# Patient Record
Sex: MALE | Race: WHITE | NOT HISPANIC OR LATINO | URBAN - METROPOLITAN AREA
[De-identification: names, ages, dates, MRNs, and addresses within clinical notes are randomized per-mention and may not be internally consistent; named-entity substitution may affect disease eponyms.]

---

## 2018-03-27 ENCOUNTER — OUTPATIENT (OUTPATIENT)
Dept: OUTPATIENT SERVICES | Facility: HOSPITAL | Age: 58
LOS: 1 days | End: 2018-03-27

## 2019-12-04 ENCOUNTER — APPOINTMENT (OUTPATIENT)
Dept: CARDIOLOGY | Facility: CLINIC | Age: 59
End: 2019-12-04

## 2019-12-04 PROBLEM — Z00.00 ENCOUNTER FOR PREVENTIVE HEALTH EXAMINATION: Status: ACTIVE | Noted: 2019-12-04

## 2019-12-18 PROBLEM — Z78.9 CAFFEINE USE: Status: ACTIVE | Noted: 2019-12-13

## 2019-12-18 PROBLEM — I49.9 ARRHYTHMIA: Status: ACTIVE | Noted: 2019-12-13

## 2019-12-18 PROBLEM — I25.10 CARDIOVASCULAR DISEASE: Status: ACTIVE | Noted: 2019-12-13

## 2019-12-18 PROBLEM — I34.1 MITRAL VALVE PROLAPSE: Status: ACTIVE | Noted: 2019-12-13

## 2019-12-18 PROBLEM — I34.0 MITRAL VALVE REGURGITATION: Status: ACTIVE | Noted: 2019-12-13

## 2019-12-18 PROBLEM — I38 VALVULAR HEART DISEASE: Status: ACTIVE | Noted: 2019-12-13

## 2019-12-18 PROBLEM — I48.0 PAROXYSMAL ATRIAL FIBRILLATION: Status: ACTIVE | Noted: 2019-12-13

## 2019-12-18 PROBLEM — Z78.9 SOCIAL ALCOHOL USE: Status: ACTIVE | Noted: 2019-12-13

## 2019-12-18 PROBLEM — Z86.79 HISTORY OF CARDIOMYOPATHY: Status: RESOLVED | Noted: 2019-12-13 | Resolved: 2019-12-18

## 2019-12-19 ENCOUNTER — APPOINTMENT (OUTPATIENT)
Dept: ELECTROPHYSIOLOGY | Facility: CLINIC | Age: 59
End: 2019-12-19
Payer: COMMERCIAL

## 2019-12-19 VITALS
OXYGEN SATURATION: 98 % | HEART RATE: 49 BPM | WEIGHT: 191 LBS | BODY MASS INDEX: 25.31 KG/M2 | HEIGHT: 73 IN | DIASTOLIC BLOOD PRESSURE: 70 MMHG | SYSTOLIC BLOOD PRESSURE: 112 MMHG

## 2019-12-19 DIAGNOSIS — I49.9 CARDIAC ARRHYTHMIA, UNSPECIFIED: ICD-10-CM

## 2019-12-19 DIAGNOSIS — I25.10 ATHEROSCLEROTIC HEART DISEASE OF NATIVE CORONARY ARTERY W/OUT ANGINA PECTORIS: ICD-10-CM

## 2019-12-19 DIAGNOSIS — Z78.9 OTHER SPECIFIED HEALTH STATUS: ICD-10-CM

## 2019-12-19 DIAGNOSIS — I34.0 NONRHEUMATIC MITRAL (VALVE) INSUFFICIENCY: ICD-10-CM

## 2019-12-19 DIAGNOSIS — I38 ENDOCARDITIS, VALVE UNSPECIFIED: ICD-10-CM

## 2019-12-19 DIAGNOSIS — Z86.79 PERSONAL HISTORY OF OTHER DISEASES OF THE CIRCULATORY SYSTEM: ICD-10-CM

## 2019-12-19 DIAGNOSIS — I48.0 PAROXYSMAL ATRIAL FIBRILLATION: ICD-10-CM

## 2019-12-19 DIAGNOSIS — I34.1 NONRHEUMATIC MITRAL (VALVE) PROLAPSE: ICD-10-CM

## 2019-12-19 PROCEDURE — 99204 OFFICE O/P NEW MOD 45 MIN: CPT | Mod: 25

## 2019-12-19 PROCEDURE — 93000 ELECTROCARDIOGRAM COMPLETE: CPT

## 2019-12-19 NOTE — REASON FOR VISIT
[Consultation] : a consultation regarding [Atrial Fibrillation] : atrial fibrillation [Spouse] : spouse [FreeTextEntry1] : ref Dr Garrison

## 2019-12-19 NOTE — HISTORY OF PRESENT ILLNESS
[FreeTextEntry1] : 59 year old gentleman with history of cardiomyopathy (suspected to be AF related), MV prolapse and MR, and persistent atrial fibrillation s/p prior ablations presenting for evaluation of atrial fibrillation. \par He has a long history of pAF initially diagnosed in 2005. At that time he had severe LV dysfunction (LVEF <20%) thought to be related to be AF and tachycardia (cath at that time revealed normal coronaries). He underwent several cardioversions,  and ultimately underwent AF ablation in 2/2013 (Dr. Wilbur Nascimento). He had recurrent AF and had repeat ablation in 2016, but reportedly was told there was nothing new to do at that time, and no ablation was performed.  He had infrequent episodes of AF since his ablations and has had improved LV function (TTE 6/2017 revealed LVEF 45-50%). Over the last 2 weeks he has noted fatigue and dyspnea with strenuous activity, and has felt that he was back in AF. On ECG today he is in atrial fibrillation with rapid ventricular rates (avg 133 bpm, with narrow QRS), and prior ECG 12/3/19 revealed atrial fibrillation with average HR  115 bpm.\par He had been on ASA, but restarted Xarelto when AF recurred, and has remained on bisoprolol 10mg qd.\par TTE repeated 12/13/19 revealed LVEF 43%, with mod-severe eccentric MR\par He reports he strongly desires to avoid additional medical therapy. \par When he is not in atrial fibrillation he feels very well, and exercises regularly without limitation. \par

## 2019-12-19 NOTE — REVIEW OF SYSTEMS
[Feeling Fatigued] : feeling fatigued [Negative] : Musculoskeletal [Fever] : no fever [Chills] : no chills [Shortness Of Breath] : no shortness of breath [Dyspnea on exertion] : not dyspnea during exertion [Chest Pain] : no chest pain [Palpitations] : no palpitations [Dizziness] : no dizziness [Convulsions] : no convulsions [Confusion] : no confusion was observed [Anxiety] : no anxiety [Easy Bleeding] : no tendency for easy bleeding [Easy Bruising] : no tendency for easy bruising

## 2019-12-19 NOTE — PHYSICAL EXAM
[General Appearance - Well Developed] : well developed [General Appearance - Well Nourished] : well nourished [Well Groomed] : well groomed [General Appearance - In No Acute Distress] : no acute distress [Normal Oral Mucosa] : normal oral mucosa [Normal Jugular Venous V Waves Present] : normal jugular venous V waves present [Murmurs] : no murmurs present [Heart Sounds] : normal S1 and S2 [Edema] : no peripheral edema present [Respiration, Rhythm And Depth] : normal respiratory rhythm and effort [Auscultation Breath Sounds / Voice Sounds] : lungs were clear to auscultation bilaterally [Abdomen Soft] : soft [Abdomen Tenderness] : non-tender [Abnormal Walk] : normal gait [Cyanosis, Localized] : no localized cyanosis [Nail Clubbing] : no clubbing of the fingernails [Skin Color & Pigmentation] : normal skin color and pigmentation [Oriented To Time, Place, And Person] : oriented to person, place, and time [Impaired Insight] : insight and judgment were intact [] : no rash [No Anxiety] : not feeling anxious [FreeTextEntry1] : irregularly irregular, rapid rhythm

## 2019-12-19 NOTE — DISCUSSION/SUMMARY
[FreeTextEntry1] : 59 year old gentleman with MV prolapse and MR, and persistent atrial fibrillation with associated cardiomyopathy s/p prior ablations, presenting for evaluation of recurrent atrial fibrillation. He has a very long history of AF with RVR and has had severe LV dysfunction in this setting. He has undergone several DCCVs and prior ablation, but now has recurrent AF with rapid rates despite beta blockade with Bystolic 10mg qd, and moderate LV dysfunction. He does also have mitral valve disease and at least moderate MR, but has been asymptomatic from this perspective apart from the arrhythmia. \par Given his cardiomyopathy, we discussed the importance of strict rate control, and preferably restoration of sinus rhythm. He does strongly desire to avoid antiarrhythmic medication. We did discuss the potential for another AF ablation, which would likely require ablation of non-PV substrate. In addition, if his mitral regurgitation is progressive, he may ultimately require MV surgery, in which case concomitant surgical AF ablation, and potentially VALENTINA resection, could be performed. At this point, he is not agreeable to proceed with further ablation, but will consider it. \par As an initial strategy will plan MATT/DCCV to restore sinus rhythm. In the interim, will increase rate control to Bystolic 10mg bid. Will continue Xarelto. \par At time of MATT, will evaluate severity of MV regurgitation. If severe, would consider surgical referral for MV repair and concomitant AF ablation/VALENTINA resection.\par Follow-up after above to reconsider long-term management options.\par

## 2020-01-07 ENCOUNTER — APPOINTMENT (OUTPATIENT)
Dept: ULTRASOUND IMAGING | Facility: CLINIC | Age: 60
End: 2020-01-07
Payer: COMMERCIAL

## 2020-01-07 PROCEDURE — 76705 ECHO EXAM OF ABDOMEN: CPT

## 2020-01-14 ENCOUNTER — OUTPATIENT (OUTPATIENT)
Dept: OUTPATIENT SERVICES | Facility: HOSPITAL | Age: 60
LOS: 1 days | End: 2020-01-14
Payer: COMMERCIAL

## 2020-01-14 VITALS
OXYGEN SATURATION: 98 % | WEIGHT: 197.98 LBS | TEMPERATURE: 98 F | RESPIRATION RATE: 18 BRPM | HEIGHT: 74 IN | SYSTOLIC BLOOD PRESSURE: 132 MMHG | DIASTOLIC BLOOD PRESSURE: 99 MMHG | HEART RATE: 96 BPM

## 2020-01-14 DIAGNOSIS — Z01.818 ENCOUNTER FOR OTHER PREPROCEDURAL EXAMINATION: ICD-10-CM

## 2020-01-14 LAB
ANION GAP SERPL CALC-SCNC: 12 MMOL/L — SIGNIFICANT CHANGE UP (ref 5–17)
APTT BLD: 39.5 SEC — HIGH (ref 27.5–36.3)
BUN SERPL-MCNC: 22 MG/DL — HIGH (ref 8–20)
CALCIUM SERPL-MCNC: 9.2 MG/DL — SIGNIFICANT CHANGE UP (ref 8.6–10.2)
CHLORIDE SERPL-SCNC: 102 MMOL/L — SIGNIFICANT CHANGE UP (ref 98–107)
CO2 SERPL-SCNC: 23 MMOL/L — SIGNIFICANT CHANGE UP (ref 22–29)
CREAT SERPL-MCNC: 0.85 MG/DL — SIGNIFICANT CHANGE UP (ref 0.5–1.3)
GLUCOSE SERPL-MCNC: 102 MG/DL — SIGNIFICANT CHANGE UP (ref 70–115)
HCT VFR BLD CALC: 44.3 % — SIGNIFICANT CHANGE UP (ref 39–50)
HGB BLD-MCNC: 14.7 G/DL — SIGNIFICANT CHANGE UP (ref 13–17)
INR BLD: 2.22 RATIO — HIGH (ref 0.88–1.16)
MAGNESIUM SERPL-MCNC: 1.9 MG/DL — SIGNIFICANT CHANGE UP (ref 1.6–2.6)
MCHC RBC-ENTMCNC: 30.6 PG — SIGNIFICANT CHANGE UP (ref 27–34)
MCHC RBC-ENTMCNC: 33.2 GM/DL — SIGNIFICANT CHANGE UP (ref 32–36)
MCV RBC AUTO: 92.3 FL — SIGNIFICANT CHANGE UP (ref 80–100)
PLATELET # BLD AUTO: 162 K/UL — SIGNIFICANT CHANGE UP (ref 150–400)
POTASSIUM SERPL-MCNC: 4.1 MMOL/L — SIGNIFICANT CHANGE UP (ref 3.5–5.3)
POTASSIUM SERPL-SCNC: 4.1 MMOL/L — SIGNIFICANT CHANGE UP (ref 3.5–5.3)
PROTHROM AB SERPL-ACNC: 26.2 SEC — HIGH (ref 10–12.9)
RBC # BLD: 4.8 M/UL — SIGNIFICANT CHANGE UP (ref 4.2–5.8)
RBC # FLD: 11.9 % — SIGNIFICANT CHANGE UP (ref 10.3–14.5)
SODIUM SERPL-SCNC: 137 MMOL/L — SIGNIFICANT CHANGE UP (ref 135–145)
WBC # BLD: 6.97 K/UL — SIGNIFICANT CHANGE UP (ref 3.8–10.5)
WBC # FLD AUTO: 6.97 K/UL — SIGNIFICANT CHANGE UP (ref 3.8–10.5)

## 2020-01-14 PROCEDURE — 83735 ASSAY OF MAGNESIUM: CPT

## 2020-01-14 PROCEDURE — 85610 PROTHROMBIN TIME: CPT

## 2020-01-14 PROCEDURE — 93005 ELECTROCARDIOGRAM TRACING: CPT

## 2020-01-14 PROCEDURE — 85730 THROMBOPLASTIN TIME PARTIAL: CPT

## 2020-01-14 PROCEDURE — 93010 ELECTROCARDIOGRAM REPORT: CPT

## 2020-01-14 PROCEDURE — 85027 COMPLETE CBC AUTOMATED: CPT

## 2020-01-14 PROCEDURE — 80048 BASIC METABOLIC PNL TOTAL CA: CPT

## 2020-01-14 PROCEDURE — 36415 COLL VENOUS BLD VENIPUNCTURE: CPT

## 2020-01-14 PROCEDURE — G0463: CPT

## 2020-01-14 NOTE — H&P PST ADULT - ASSESSMENT
59 year old gentleman with history of cardiomyopathy (suspected to be AF related), MV prolapse and MR, and persistent atrial fibrillation s/p prior ablations and multiple DCCVs presenting for repeat MATT/DCCV on 1/17/2020     - NPO post midnight  - Patient instructed to continue uninterrupted anticoagulation    **At the time of MATT, will evaluate severity of MV regurgitation. If severe, would consider surgical referral for MV repair and concomitant AF ablation/VALENTINA resection.  Follow-up after above to reconsider long-term management options as outpatient**

## 2020-01-14 NOTE — H&P PST ADULT - RS GEN PE MLT RESP DETAILS PC
clear to auscultation bilaterally/good air movement/no chest wall tenderness/airway patent/breath sounds equal/respirations non-labored

## 2020-01-14 NOTE — H&P PST ADULT - HISTORY OF PRESENT ILLNESS
59 year old gentleman with history of cardiomyopathy (suspected to be AF related), MV prolapse and MR, and persistent atrial fibrillation s/p prior ablations presenting for evaluation of atrial fibrillation.  He has a long history of PAF initially diagnosed in 2005. At that time he had severe LV dysfunction (LVEF <20%) thought to be related to be AF and tachycardia (cath at that time revealed normal coronaries). He underwent several cardioversions, and ultimately underwent AF ablation in 2/2013 (Dr. iWlbur Nascimento). He had recurrent AF and had repeat ablation in 2016, but reportedly was told there was nothing new to do at that time, and no ablation was performed. He had infrequent episodes of AF since his ablations and has had improved LV function (TTE 6/2017 revealed LVEF 45-50%). Over the last several weeks he has noted fatigue and dyspnea with strenuous activity, and has felt that he was back in AF. He reports he strongly desires to avoid additional medical therapy. When he is not in atrial fibrillation he feels very well, and exercises regularly without limitation.     Cardiology Summary  Echo: 12/13/19, LVEF 43%, LA mildly dilated (LA 4.5cm), moderately severe eccentric MR mod MV

## 2020-01-17 ENCOUNTER — OUTPATIENT (OUTPATIENT)
Dept: OUTPATIENT SERVICES | Facility: HOSPITAL | Age: 60
LOS: 1 days | End: 2020-01-17
Payer: COMMERCIAL

## 2020-01-17 ENCOUNTER — TRANSCRIPTION ENCOUNTER (OUTPATIENT)
Age: 60
End: 2020-01-17

## 2020-01-17 VITALS
OXYGEN SATURATION: 99 % | TEMPERATURE: 98 F | HEART RATE: 86 BPM | RESPIRATION RATE: 16 BRPM | SYSTOLIC BLOOD PRESSURE: 138 MMHG | DIASTOLIC BLOOD PRESSURE: 81 MMHG

## 2020-01-17 DIAGNOSIS — I49.9 CARDIAC ARRHYTHMIA, UNSPECIFIED: ICD-10-CM

## 2020-01-17 PROCEDURE — 93005 ELECTROCARDIOGRAM TRACING: CPT

## 2020-01-17 PROCEDURE — 93320 DOPPLER ECHO COMPLETE: CPT

## 2020-01-17 PROCEDURE — 92960 CARDIOVERSION ELECTRIC EXT: CPT

## 2020-01-17 PROCEDURE — 93320 DOPPLER ECHO COMPLETE: CPT | Mod: 26

## 2020-01-17 PROCEDURE — 93312 ECHO TRANSESOPHAGEAL: CPT

## 2020-01-17 PROCEDURE — 93010 ELECTROCARDIOGRAM REPORT: CPT

## 2020-01-17 PROCEDURE — 93325 DOPPLER ECHO COLOR FLOW MAPG: CPT | Mod: 26

## 2020-01-17 PROCEDURE — 93325 DOPPLER ECHO COLOR FLOW MAPG: CPT

## 2020-01-17 PROCEDURE — 93312 ECHO TRANSESOPHAGEAL: CPT | Mod: 26

## 2020-01-17 PROCEDURE — 76376 3D RENDER W/INTRP POSTPROCES: CPT | Mod: 26

## 2020-01-17 RX ORDER — BISOPROLOL FUMARATE 10 MG/1
1 TABLET, FILM COATED ORAL
Qty: 0 | Refills: 0 | DISCHARGE
Start: 2020-01-17

## 2020-01-17 RX ORDER — BISOPROLOL FUMARATE 10 MG/1
1 TABLET, FILM COATED ORAL
Qty: 0 | Refills: 0 | DISCHARGE

## 2020-01-17 RX ORDER — OMEGA-3 ACID ETHYL ESTERS 1 G
1 CAPSULE ORAL
Qty: 0 | Refills: 0 | DISCHARGE

## 2020-01-17 RX ORDER — RIVAROXABAN 15 MG-20MG
1 KIT ORAL
Qty: 0 | Refills: 0 | DISCHARGE

## 2020-01-17 NOTE — PROGRESS NOTE ADULT - SUBJECTIVE AND OBJECTIVE BOX
Pt doing well s/p uncomplicated MATT & DCCV, 200J X 1.  Pt denies complaint post procedure.        PAST MEDICAL & SURGICAL HISTORY:  AF (atrial fibrillation)    Exam:   VSS, NAD, A&O x 3  Skin: no erythema/edema/blistering at defib pad sites.   Card: S1/S2, RRR, no m/g/r  Resp: lungs CTA b/l  Abd: S/NT/ND  Ext: no edema, distal pulses intact    EKG: sinus bradycardia 40bpm     MATT 1/17/2020:   Summary:   1. Left ventricular ejection fraction, by visual estimation, is 30 to 35%.   2. Severely decreased global left ventricular systolic function.   3. Mildly reduced RV systolic function.   4. 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   5. Mild tricuspid regurgitation.   6. No intracardiac thrombus   7. Small patent foramen ovale.   8. No significant atheroma   9. No pericardial effusion  10. ** No prior echocardiograms available for comparison.  Yadira Sheriff DO Electronically signed on 1/17/2020 at 9:30:46 AM     Assessment:   59 year old gentleman with history of cardiomyopathy (suspected to be AF related), MV prolapse and MR, and persistent atrial fibrillation s/p prior ablations and multiple DCCVs.  He presented electively and is now status post MATT negative for VALENTINA thrombus and uncomplicated DCCV with restoration of sinus rhythm.     Plan:   Observation on telemetry per post op protocol.    Resume PO intake.   Ambulate w/ assist once fully awake & back to baseline mental status w/ VSS.  Continue Xarelto. Importance of strict compliance with anticoagulation regimen reinforced with pt.   Decrease bisoprolol to once daily.    Resume other home medications.   Anticipate d/c home once all criteria met, with outpt f/up in 1 month. Pt doing well s/p uncomplicated MATT & DCCV, 200J X 1.  Pt denies complaint post procedure.        Pt with positive HR response to exercise, 60-70s.  Pt denies dizziness, lightheadedness.     PAST MEDICAL & SURGICAL HISTORY:  AF (atrial fibrillation)    Exam:   VSS, NAD, A&O x 3  Skin: no erythema/edema/blistering at defib pad sites.   Card: S1/S2, RRR, no m/g/r  Resp: lungs CTA b/l  Abd: S/NT/ND  Ext: no edema, distal pulses intact    EKG: sinus bradycardia 40bpm     MATT 1/17/2020:   Summary:   1. Left ventricular ejection fraction, by visual estimation, is 30 to 35%.   2. Severely decreased global left ventricular systolic function.   3. Mildly reduced RV systolic function.   4. 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   5. Mild tricuspid regurgitation.   6. No intracardiac thrombus   7. Small patent foramen ovale.   8. No significant atheroma   9. No pericardial effusion  10. ** No prior echocardiograms available for comparison.  Yadira Sheriff DO Electronically signed on 1/17/2020 at 9:30:46 AM     Assessment:   59 year old gentleman with history of cardiomyopathy (suspected to be AF related), MV prolapse and MR, and persistent atrial fibrillation s/p prior ablations and multiple DCCVs.  He presented electively and is now status post MATT negative for VALENTINA thrombus and uncomplicated DCCV with restoration of sinus rhythm.     Plan:   Observation on telemetry per post op protocol.    Resume PO intake.   Ambulate w/ assist once fully awake & back to baseline mental status w/ VSS.  Continue Xarelto. Importance of strict compliance with anticoagulation regimen reinforced with pt.   Decrease bisoprolol to once daily.    Resume other home medications.   Anticipate d/c home once all criteria met, with outpt f/up in 1 month.

## 2020-01-17 NOTE — DISCHARGE NOTE PROVIDER - NSDCCPTREATMENT_GEN_ALL_CORE_FT
PRINCIPAL PROCEDURE  Procedure: Transesophageal echocardiogram  Findings and Treatment: Notify your doctor if you develop a fever, cough up blood, have any chest pain, palpitations or difficulty breathing. You may take a throat lozenge if you develop a sore throat or try warm salt water gargle. Resume your diet with soft foods first. Follow up with your cardiologist within 2 weeks for a follow up or sooner with any concerns. Report to the nearest ER with any emergencies.        SECONDARY PROCEDURE  Procedure: Cardioversion external  Findings and Treatment: -Take each dose of your anticoagulation medication (blood thinner) exactly as prescribed.   - Do not drive, operate heavy machinery or make important decisions for 24 hours following the procedure.  - You may resume all other activities the day after the procedure.  Call your doctor if:   - your rapid heart rhythm returns.  - you have any questions or concerns regarding the procedure.  If you experience increased difficulty breathing or chest pain, or if you faint or have dizzy spells, please seek immediate medical attention.

## 2020-01-17 NOTE — DISCHARGE NOTE PROVIDER - CARE PROVIDER_API CALL
Chris Calderon)  Cardiology; Internal Medicine  [address] [facility], [address] [place] [address]  Phone: [phone]  Fax: [phone]  Follow Up Time:

## 2020-01-17 NOTE — DISCHARGE NOTE PROVIDER - NSDCFUADDINST_GEN_ALL_CORE_FT
Follow up with Dr. Calderon in 2-3 weeks.  Our office will contact you in 3-5 days to schedule this appointment. Please call [phone] with questions or concerns.

## 2020-01-17 NOTE — DISCHARGE NOTE NURSING/CASE MANAGEMENT/SOCIAL WORK - PATIENT PORTAL LINK FT
You can access the FollowMyHealth Patient Portal offered by [facility] by registering at the following website: [email or web address]. By joining [employer]’s FollowMyHealth portal, you will also be able to view your health information using other applications (apps) compatible with our system.

## 2020-01-17 NOTE — DISCHARGE NOTE PROVIDER - NSDCMRMEDTOKEN_GEN_ALL_CORE_FT
bisoprolol 10 mg oral tablet: 1 tab(s) orally once a day  Multiple Vitamins oral tablet: 1 tab(s) orally once a day  Omega-3 1000 mg oral capsule: 1 cap(s) orally once a day  Xarelto 20 mg oral tablet: 1 tab(s) orally once a day (in the evening)

## 2020-01-17 NOTE — DISCHARGE NOTE PROVIDER - HOSPITAL COURSE
59 year old gentleman with history of cardiomyopathy (suspected to be AF related), MV prolapse and MR, and persistent atrial fibrillation s/p prior ablations and multiple DCCVs.  He presented electively and is now status post MATT negative for VALENTINA thrombus and uncomplicated DCCV with restoration of sinus rhythm. 59 year old gentleman with history of cardiomyopathy (suspected to be AF related), MV prolapse and MR, and persistent atrial fibrillation s/p prior ablations and multiple DCCVs.  He presented electively and is now status post MATT negative for VALENTINA thrombus and uncomplicated DCCV with restoration of sinus rhythm.  The patient was observed per post procedure protocol, then discharged home with a plan for outpatient follow up.

## 2020-01-21 ENCOUNTER — TRANSCRIPTION ENCOUNTER (OUTPATIENT)
Age: 60
End: 2020-01-21

## 2020-01-21 PROBLEM — I48.91 UNSPECIFIED ATRIAL FIBRILLATION: Chronic | Status: ACTIVE | Noted: 2020-01-14

## 2020-02-20 ENCOUNTER — APPOINTMENT (OUTPATIENT)
Dept: ELECTROPHYSIOLOGY | Facility: CLINIC | Age: 60
End: 2020-02-20
Payer: COMMERCIAL

## 2020-02-20 VITALS
WEIGHT: 194 LBS | HEIGHT: 73 IN | SYSTOLIC BLOOD PRESSURE: 136 MMHG | HEART RATE: 58 BPM | DIASTOLIC BLOOD PRESSURE: 78 MMHG | OXYGEN SATURATION: 97 % | BODY MASS INDEX: 25.71 KG/M2

## 2020-02-20 VITALS
HEART RATE: 54 BPM | BODY MASS INDEX: 25.6 KG/M2 | SYSTOLIC BLOOD PRESSURE: 136 MMHG | DIASTOLIC BLOOD PRESSURE: 78 MMHG | HEIGHT: 73 IN

## 2020-02-20 PROCEDURE — 93000 ELECTROCARDIOGRAM COMPLETE: CPT

## 2020-02-20 PROCEDURE — 99215 OFFICE O/P EST HI 40 MIN: CPT

## 2020-02-20 NOTE — PHYSICAL EXAM
[General Appearance - Well Nourished] : well nourished [General Appearance - Well Developed] : well developed [Well Groomed] : well groomed [Normal Jugular Venous V Waves Present] : normal jugular venous V waves present [Normal Oral Mucosa] : normal oral mucosa [General Appearance - In No Acute Distress] : no acute distress [Respiration, Rhythm And Depth] : normal respiratory rhythm and effort [Auscultation Breath Sounds / Voice Sounds] : lungs were clear to auscultation bilaterally [Heart Rate And Rhythm] : heart rate and rhythm were normal [Heart Sounds] : normal S1 and S2 [Edema] : no peripheral edema present [Murmurs] : no murmurs present [Abdomen Tenderness] : non-tender [Abdomen Soft] : soft [Abnormal Walk] : normal gait [Cyanosis, Localized] : no localized cyanosis [Skin Color & Pigmentation] : normal skin color and pigmentation [Nail Clubbing] : no clubbing of the fingernails [Oriented To Time, Place, And Person] : oriented to person, place, and time [] : no rash [No Anxiety] : not feeling anxious [Impaired Insight] : insight and judgment were intact [FreeTextEntry1] : regular bradycardia

## 2020-02-20 NOTE — DISCUSSION/SUMMARY
[FreeTextEntry1] : 59 year old gentleman with history of persistent AF s/p two prior ablations, suspected AF related cardiomyopathy, presenting for follow-up after recent DCCV on 1/17/20. He has had recurrent LV dysfunction in the setting of his recurrent persistent AF, and recently had LVEF 30-35% on MATT pre DCCV. Since DCCV he has been maintaining sinus rhythm, and I do expect his LV function to recover. However, we discussed in detail that he is likely to have recurrent episodes of AF in the future, and that given his associated cardiomyopathy maintenance of sinus rhythm will be very important to reduce the risk of progressive cardiomyopathy and CHF. He is likely unable to tolerate sufficient medical therapy (either rate control or antiarrhythmic medications) due to his resting bradycardia and slower HRs with beta blockade so far that has caused him to stop taking this medication. I do therefore believe that another ablation will be the most effective method to reduce the risk of recurrent AF, and that approaches beyond PVI alone will likely be necessary to be effective with ablation. We did discuss these approaches in detail. For now he will restart a low dose of bisoprolol (if tolerated), and consider additional ablation in the future. \par In addition, we discussed his thromboembolic risk, which is particularly elevated in the initial period following DCCV. He currently has a CHADSVASc of 1 (LV dysfunction/CHF), and I suggested that he continue anticoagulation with Xarelto at least until LV recovery is documented on TTE.  \par -restart Xarelto 20mg qd. Stop concomitant ASA.\par -plan repeat TTE in 3 months post DCCV. If LV function recovered, would reconsider risks and benefits of ongoing anticoagulation. \par -restart bisoprolol 5mg qd. If symptoms of fatigue, symptomatic bradycardia, lightheadedness, syncope, etc, stop beta blockade. \par -recommend further monitoring to evaluate for recurrent (potentially asymptomatic AF) to help guide further management. An ILR may be useful particularly if he does stop anticoagulation. He has deferred this for now.\par -EP followup in 3 mo or prn if pt agreeable after follow-up with Dr. Garrison\par \par

## 2020-02-20 NOTE — HISTORY OF PRESENT ILLNESS
[FreeTextEntry1] : 59 year old gentleman with history of persistent AF s/p two prior ablations, suspected AF related cardiomyopathy, presenting for follow-up after recent DCCV on 1/17/20. \par \par He has a history of persistent AF since 2005, and had severe LV function at that time with LVEF <20% and normal coronaries on cath. He underwent AF ablation in 2/2013 and repeat ablation 2016 (unclear if any ablation performed in the second procedure, both at [place]). He subsequently had infrequent recurrent AF and had improved LV function (LVEF up to 45-50% in 6/2017).\par He recently presented with fatigue and dyspnea, and was found to have recurrent persistent AF with RVR. He restarted Xarelto and bisoprolol, and TTE revealed LVEF 43%. He recently underwent MATT/DCCV on 1/17/20.  MATT revealed LVEF 30-35% with global LV hypokinesis and mild MR (had previously been thought to be mod-severe). He was continued on anticoagulation with Xarelto, and rate control with bisoprolol (rate lowered to 10mg qd). \par On follow-up he has been feeling well. ECG today reveals sinus rhythm at 54 bpm. \par Due to resting bradycardia (he noted HRs in the 40s on his Apple Watch) he stopped bisoprolol. In addition, he stopped Xarelto because he noted some blood-tinged expectorant when he sneezed, and has been taking ASA 81mg qd alone.\par He has been feeling well, and denies palpitation, dyspnea, chest pain. He reports his only symptom in AF was fatigue which has improved. \par

## 2020-02-20 NOTE — REVIEW OF SYSTEMS
[Feeling Fatigued] : feeling fatigued [Negative] : Integumentary [Fever] : no fever [Chills] : no chills [Shortness Of Breath] : no shortness of breath [Chest Pain] : no chest pain [Dyspnea on exertion] : not dyspnea during exertion [Palpitations] : no palpitations [Dizziness] : no dizziness [Convulsions] : no convulsions [Confusion] : no confusion was observed [Anxiety] : no anxiety [Easy Bruising] : no tendency for easy bruising [Easy Bleeding] : no tendency for easy bleeding

## 2020-02-20 NOTE — REASON FOR VISIT
[Follow-Up - Clinic] : a clinic follow-up of [Atrial Fibrillation] : atrial fibrillation [FreeTextEntry1] : ref Dr Garrison

## 2020-04-30 ENCOUNTER — TRANSCRIPTION ENCOUNTER (OUTPATIENT)
Age: 60
End: 2020-04-30

## 2020-05-07 ENCOUNTER — APPOINTMENT (OUTPATIENT)
Dept: ELECTROPHYSIOLOGY | Facility: CLINIC | Age: 60
End: 2020-05-07
Payer: COMMERCIAL

## 2020-05-07 VITALS
BODY MASS INDEX: 24.78 KG/M2 | DIASTOLIC BLOOD PRESSURE: 85 MMHG | SYSTOLIC BLOOD PRESSURE: 142 MMHG | WEIGHT: 187 LBS | HEIGHT: 73 IN | HEART RATE: 72 BPM

## 2020-05-07 DIAGNOSIS — I42.9 CARDIOMYOPATHY, UNSPECIFIED: ICD-10-CM

## 2020-05-07 DIAGNOSIS — I48.19 OTHER PERSISTENT ATRIAL FIBRILLATION: ICD-10-CM

## 2020-05-07 PROCEDURE — 99214 OFFICE O/P EST MOD 30 MIN: CPT | Mod: 95

## 2020-05-07 RX ORDER — ASPIRIN 325 MG/1
325 TABLET, FILM COATED ORAL DAILY
Refills: 0 | Status: ACTIVE | COMMUNITY

## 2020-05-07 RX ORDER — RIVAROXABAN 20 MG/1
20 TABLET, FILM COATED ORAL DAILY
Refills: 0 | Status: DISCONTINUED | COMMUNITY
End: 2020-05-07

## 2020-05-07 RX ORDER — BISOPROLOL FUMARATE 5 MG/1
5 TABLET, FILM COATED ORAL DAILY
Qty: 90 | Refills: 0 | Status: DISCONTINUED | COMMUNITY
End: 2020-05-07

## 2020-05-07 NOTE — PHYSICAL EXAM
[General Appearance - Well Developed] : well developed [General Appearance - Well Nourished] : well nourished [Oriented To Time, Place, And Person] : oriented to person, place, and time [No Anxiety] : not feeling anxious

## 2020-05-08 PROBLEM — I48.19 PERSISTENT ATRIAL FIBRILLATION: Status: ACTIVE | Noted: 2020-05-08

## 2020-05-08 PROBLEM — I42.9 CARDIOMYOPATHY: Status: ACTIVE | Noted: 2020-05-08

## 2020-05-11 NOTE — HISTORY OF PRESENT ILLNESS
[Medical Office: ([facility])___] : at the medical office located in  [Home] : at home, [unfilled] , at the time of the visit. [Self] : self [Patient] : the patient [FreeTextEntry1] : Time Initiated: 1:07 pm\par Time completed:  1:50 pm\par \par 59 year old gentleman with history of persistent AF s/p two prior ablations (most recent 1/2018 @ [place]), suspected AF related cardiomyopathy, and multiple prior cardioversion most recently 1/2020 at [facility]. He has had recurrent LV dysfunction in the setting of his recurrent persistent AF, and recently had LVEF 30-35% on MATT pre DCCV.  \par \par Since DCCV he has been maintaining sinus rhythm (monitored on Apple watch).  Repeat TTE has not been preformed, but we suspect his LV function has likely recovered. Doing well today and denies recent symptoms of palpitations, dizziness, syncope, CP, SOB, GUEVARA or fatigue.  Self discontinued Xarelto 90 days after cardioversion. He also stopped his Bioprolol 5mg due to baseline bradycardia (HR in high 30 - 40's at rest).  \par Repeat AF ablation was discussed at prior consultation with Dr. Calderon.  Patient was considering but not ready for planning at that time. \par \par CHADSVASC 1-2 for CM and ?HTN.  No former diagnosis of HTN but notes SBP often runs in high 130-140's. [FreeTextEntry2] : Erickson Avila [FreeTextEntry4] : NALLELY Worrell

## 2020-05-11 NOTE — ADDENDUM
[FreeTextEntry1] : I was present for the above visit (on telehealth) and agree with history and assessment/plan as above. Pt generally feeling well, but now increasingly concerned about likelihood of recurrent AF episodes, and given his recurrent cardiomyopathy and LV dysfunction in the setting of AF, warrants further efforts for rhythm control. We discussed management options, but given his baseline bradycardia is not a good candidate for long-term antiarrhythmic medications. We discussed AF ablation in detail, including procedure related risks such as bleeding, vascular injury, cardiac perforation, stroke, esophageal injury. He expressed understanding, and does want to proceed with AF ablation. Of note, he has a history of cryoAF ablation in the past (and a second procedure in which it is likely no ablation was performed potentinally due to persistent PVI at that time).

## 2020-05-11 NOTE — DISCUSSION/SUMMARY
[FreeTextEntry1] : 59 year old gentleman with history of persistent AF s/p two prior ablations (most recent 1/2018 @ [place]), suspected AF related cardiomyopathy, and multiple prior cardioversion most recently 1/2020 at [facility]. He has had recurrent LV dysfunction in the setting of his recurrent persistent AF (LVEF 30-35% on MATT pre DCCV).  \par Seen today for f/up via Telehealth.  Reportedly maintaining sinus rhythm (monitored on Apple watch) and asymptomatic.  Repeat TTE has not been preformed, but we suspect his LV function has likely recovered. CHADSVASC 1-2 for CM and possible HTN.  Self discontinued a/c at > 90 days post cardioversion.  Denies associated bleeding issues.  No longer on beta blocker due to baseline bradycardia. \par \par We discussed the likelihood of AF recurrence in the future. Also the importance of maintaining SR given risk of progressive cardiomyopathy and CHF.  We discussed long term rhythm control options including repeat ablation vs AAT. He is likely unable to tolerate sufficient medical therapy due to his resting bradycardia and would like to avoid long term medications. Given this, patient would like to proceed with repeat AF ablation. \par  \par - Restart Xarelto 20mg qd. \par - Plan for AF ablation with Dr. Calderon\par - MATT prior to ablation to check for VALENTINA thrombus and reassess LV function\par \par Seen and d/w Dr. Calderon. \par Lucero Swan PAC\par \par

## 2021-03-30 ENCOUNTER — APPOINTMENT (OUTPATIENT)
Dept: DISASTER EMERGENCY | Facility: OTHER | Age: 61
End: 2021-03-30
Payer: COMMERCIAL

## 2021-03-30 PROCEDURE — 0011A: CPT

## 2021-04-27 ENCOUNTER — APPOINTMENT (OUTPATIENT)
Dept: DISASTER EMERGENCY | Facility: OTHER | Age: 61
End: 2021-04-27
Payer: COMMERCIAL

## 2021-04-27 PROCEDURE — 0012A: CPT

## 2021-05-14 ENCOUNTER — APPOINTMENT (OUTPATIENT)
Dept: ULTRASOUND IMAGING | Facility: CLINIC | Age: 61
End: 2021-05-14
Payer: COMMERCIAL

## 2021-05-14 PROCEDURE — 76705 ECHO EXAM OF ABDOMEN: CPT

## 2021-05-18 ENCOUNTER — APPOINTMENT (OUTPATIENT)
Dept: MRI IMAGING | Facility: CLINIC | Age: 61
End: 2021-05-18
Payer: COMMERCIAL

## 2021-05-18 PROCEDURE — 73721 MRI JNT OF LWR EXTRE W/O DYE: CPT | Mod: RT

## 2022-11-04 NOTE — DISCHARGE NOTE NURSING/CASE MANAGEMENT/SOCIAL WORK - NSDCPEXARELTODIET_GEN_ALL_CORE
Instructions:   Medications: decrease entresto to 24/26 pills twice a day continue other meds  Labs in one week  Follow up: as scheduled with Dr. Ahsan Neville, 6 weeks with Lizbeth Medina Eat healthy foods you enjoy. Rivaroxaban/Xarelto DOES NOT have a special diet. Limit your alcohol intake.

## 2023-05-18 NOTE — DISCHARGE NOTE PROVIDER - NSDCHC_MEDRECSTATUS_GEN_ALL_CORE
Admission Reconciliation is Completed  Discharge Reconciliation is Completed Fluconazole Counseling:  Patient counseled regarding adverse effects of fluconazole including but not limited to headache, diarrhea, nausea, upset stomach, liver function test abnormalities, taste disturbance, and stomach pain.  There is a rare possibility of liver failure that can occur when taking fluconazole.  The patient understands that monitoring of LFTs and kidney function test may be required, especially at baseline. The patient verbalized understanding of the proper use and possible adverse effects of fluconazole.  All of the patient's questions and concerns were addressed.

## 2024-09-20 NOTE — ASU PATIENT PROFILE, ADULT - MUTUALITY COMMENT, PROFILE

## 2024-11-15 ENCOUNTER — APPOINTMENT (OUTPATIENT)
Dept: CT IMAGING | Facility: CLINIC | Age: 64
End: 2024-11-15
Payer: MEDICARE

## 2024-11-15 PROCEDURE — 75574 CT ANGIO HRT W/3D IMAGE: CPT

## 2025-04-29 RX ORDER — METOPROLOL SUCCINATE 25 MG/1
25 TABLET, EXTENDED RELEASE ORAL
Refills: 0 | Status: ACTIVE | COMMUNITY

## 2025-05-06 ENCOUNTER — APPOINTMENT (OUTPATIENT)
Dept: CARDIOTHORACIC SURGERY | Facility: CLINIC | Age: 65
End: 2025-05-06
Payer: MEDICARE

## 2025-05-06 VITALS
TEMPERATURE: 98.1 F | HEIGHT: 73.5 IN | DIASTOLIC BLOOD PRESSURE: 86 MMHG | RESPIRATION RATE: 16 BRPM | SYSTOLIC BLOOD PRESSURE: 136 MMHG | OXYGEN SATURATION: 99 % | WEIGHT: 195 LBS | HEART RATE: 58 BPM | BODY MASS INDEX: 25.29 KG/M2

## 2025-05-06 DIAGNOSIS — I42.9 CARDIOMYOPATHY, UNSPECIFIED: ICD-10-CM

## 2025-05-06 DIAGNOSIS — I25.10 ATHEROSCLEROTIC HEART DISEASE OF NATIVE CORONARY ARTERY W/OUT ANGINA PECTORIS: ICD-10-CM

## 2025-05-06 DIAGNOSIS — I38 ENDOCARDITIS, VALVE UNSPECIFIED: ICD-10-CM

## 2025-05-06 DIAGNOSIS — I48.19 OTHER PERSISTENT ATRIAL FIBRILLATION: ICD-10-CM

## 2025-05-06 DIAGNOSIS — I34.0 NONRHEUMATIC MITRAL (VALVE) INSUFFICIENCY: ICD-10-CM

## 2025-05-06 DIAGNOSIS — Z78.9 OTHER SPECIFIED HEALTH STATUS: ICD-10-CM

## 2025-05-06 DIAGNOSIS — I48.0 PAROXYSMAL ATRIAL FIBRILLATION: ICD-10-CM

## 2025-05-06 DIAGNOSIS — I34.1 NONRHEUMATIC MITRAL (VALVE) PROLAPSE: ICD-10-CM

## 2025-05-06 PROCEDURE — 99204 OFFICE O/P NEW MOD 45 MIN: CPT

## 2025-06-05 NOTE — H&P PST ADULT - NSICDXPROCEDURE_GEN_ALL_CORE_FT
PROCEDURES:  Left and right heart catheterization 06-Jun-2025 08:10:23  Ercikson Kerns  Transesophageal echocardiogram 06-Jun-2025 08:10:32  Erickson Kerns

## 2025-06-05 NOTE — H&P PST ADULT - ASSESSMENT
Assessment:     ASA: 3  Mall: 2  ABR:   GFR:   Creatinine:     Problem List:   1. Severe MR  ·	LHC and possible intervention. Consent obtained.  ·	Procedure explained and questions answered.   ·	Will start DAPT if PCI performed.  ·	IV:  mL IV over 1 hour pre and post procedure  ·	Aspirin if not taken today.  ·	Pt. assessed, appropriate for sedation, pt.  educated regarding the plan for Versed/fentanyl as needed    2.     Discharge Planning:   ·	Discharge today if no PCI performed.  ·	Discharge in AM if PCI performed. Assessment: 64y/o male never smoker PMH MR, MVP, PAF (s/p ablation 2013 and 2016, 8 DCCV last one in 2024, not on AC), cardiomyopathy who presented to CTS Dr. Call to MV evaluation. In 2019, was noted to have moderately severe eccentric MR with moderate MVP of both leaflets. Although he reported no symptoms to his cardiologist, MR was noted to progress to severe with EF 50% and was referred for surgical evaluation. Reports an increase in palpitations when lying on the left side.      ASA: 3  Mall: 2  ABR: 1.0%  GFR: 98  Creatinine: 0.79    Problem List:   1. Severe MR  ·	LHC and possible intervention. Consent obtained.  ·	MATT today.  ·	Procedure explained and questions answered.   ·	Will start DAPT if PCI performed.  ·	IV:  mL IV over 1 hour pre and post procedure  ·	Aspirin if not taken today.  ·	Pt. assessed, appropriate for sedation, pt.  educated regarding the plan for Versed/fentanyl as needed    2. HFimpEF  ·	RHC today  ·	Continue Toprol 25 mg daily    Discharge Planning:   ·	Discharge today if no PCI performed.  ·	Discharge in AM if PCI performed.

## 2025-06-05 NOTE — H&P PST ADULT - OTHER CARE PROVIDERS
Cards- Dr. Maricel Garrison ([facility]), EP Dr. Ceja, CTS- [facility] Maricel Garrison ([facility]), Dyllan Call ([facility], [address] [place] [address], [place] [address], Tel: [phone]) Maricel Garrison ([address] [facility], [address], [place] [address], [phone], Fax: [phone]), Dyllan Call ([facility], [address] [place] [address], Tel: [phone])

## 2025-06-05 NOTE — H&P PST ADULT - HISTORY OF PRESENT ILLNESS
Narrative: 65F PMH MR, MVP, PAF (s/p ablation 2013 and 2016, 8 DCCV last one in 2024, not on AC), cardiomyopathy who presented to [facility] Dr. Call to MV evaluation. In 2019, was noted to have moderately severe eccentric MR with moderate MVP of both leaflets. Although he reported no symptoms to his cardiologist, MR was noted to progress to severe with EF 50% and was referred for surgical evaluation. Reports an increase in palpitations when lying on the left side. Presents for LHC to evaluate coronary anatomy and MATT to evaluate MR/MVP.     Review of Systems: negative unless mentioned in HPI    Symptoms: palpitations       Angina (Class):        Ischemic Symptoms:     Heart Failure:        Systolic/Diastolic/Combined:        NYHA Class (within 2 weeks):     Assessment of LVEF (Must be within 6 months):       EF: 50%       Assessed by: TTE       Date: 3/5/25    Prior Cardiac Interventions (LHC, stents, CABG):       PCI's (Date, Stents, Vessels):        CABG (Date, Grafts):     EKG:    Stress Test (Date, Findings):     Echo (Date, Findings): 3/5/25 TTE: LV moderately dilated, EF 50%, basal inferoseptal segment and basal inferior segment abnormal, normal RV function, LA mildly dilated, aortic root at sinus of valsalva 4.2cm, severe MR, mild TX, MVP, AV thickening, hypermobile interatrial septum    Antianginal Therapies:        Beta Blockers:  Toprol 25mg PO QD       Calcium Channel Blockers:        Long Acting Nitrates:        Ranexa:     Associated Risk Factors:        Frailty Score: (N/A, mild, moderate, severe)       Cerebrovascular Disease: N/A       Chronic Lung Disease: N/A       Peripheral Arterial Disease: N/A       Chronic Kidney Disease (if yes, what is GFR): N/A       Uncontrolled Diabetes (if yes, what is HgbA1C or FBS): N/A       Poorly Controlled Hypertension (if yes, what is SBP): N/A       Morbid Obesity (if yes, what is BMI): N/A       History of Recent Ventricular Arrhythmia: N/A       Inability to Ambulate Safely: N/A       Need for Therapeutic Anticoagulation: N/A       Antiplatelet or Contrast Allergy: N/A    VITAL SIGNS:    PHYSICAL EXAM:  Constitutional: A & O x 3, NAD  HEENT:  Normal oral mucosa, PERRL, EOMI	  Cardiovascular: S1 S2, III/VI *** murmur, No JVD  Respiratory: Lungs clear to auscultation	  Gastrointestinal:  Soft, Non-tender, + BS	  Skin: No rashes or cyanosis  Neurologic: No deficit appreciated  Extremities: Normal range of motion, *** edema  Vascular: distal pulses +     LABS:        MEDICATIONS:  Toprol 25mg PO QD    Risk Stratification:  ASA:   Mallampati:   Bleeding Risk:   Creatinine:   GFR:   Pt assessed, appropriate for sedation, pt educated regarding the plan for Versed/Fentanyl as needed.    Plan/Recommendations:   -plan for LHC/MATT  -preferred access: RRA vs. RFA  -patient seen and examined  -confirmed appropriate NPO duration  -ECG and Labs reviewed  -Aspirin 81mg po pre-cath ***  -NS 250mL IV bolus pre-cath ***  -procedure discussed with patient; risks and benefits explained, questions answered  -consent obtained by attending IC    Risks, benefits, and alternatives reviewed.  Risks including but not limited to MI, death, stroke, bleeding, infection, vessel injury, hematoma, renal failure, allergic reaction, urgent open heart surgery, restenosis and stent thrombosis were reviewed.  All questions answered.  Patient is agreeable to proceed. 65F PMH MR, MVP, PAF (s/p ablation 2013 and 2016, 8 DCCV last one in 2024, not on AC), cardiomyopathy who presented to CTS Dr. Call to MV evaluation. In 2019, was noted to have moderately severe eccentric MR with moderate MVP of both leaflets. Although he reported no symptoms to his cardiologist, MR was noted to progress to severe with EF 50% and was referred for surgical evaluation. Reports an increase in palpitations when lying on the left side. Presents for LHC to evaluate coronary anatomy and MATT to evaluate MR/MVP.     Symptoms:        Angina (Class):        Ischemic Symptoms:     Heart Failure: Chronic ACC/AHA stage C, HFimpEF    Assessment of LVEF:       EF: 50%       Assessed by: Echo       Date: 3/5/2025    Prior Cardiac Interventions:  LHC/PCI's: N/A    CT Surgery: N/A    EP:   ·	AF ablation X 2 (most recent 1/2018 @ [facility])  ·	Multiple prior cardioversion most recently 1/2020 at [facility]    Noninvasive Testing:   Cardiac CTA Calcium Score: 11/15/2024  LM: (17) The left main coronary artery is narrowed by less than 25% due to due to calcified plaque  The left main trifurcates into LAD, LCx and ramus intermedius.  The ramus intermedius demonstrates no significant stenosis or plaque.  LAD: (60) There are two diagonal branches. Distal vessel wraps around apex.  ·	Proximal: Minimal luminal narrowing due to calcified plaque.  ·	Mid: Normal .  ·	Distal: Normal .  ·	First diagonal: Normal .  ·	Second diagonal: Normal .  LCX: (18) There are one obtuse marginal branches.  ·	Proximal: Minimal luminal narrowing due to calcified plaque.  ·	Mid/Distal: Normal .  ·	First obtuse marginal: Normal .  RCA: (0)  ·	Proximal: Normal .  ·	Mid: Normal .  ·	Distal: Normal .  Total: 114    Stress Test:        Protocol: Dread       Duration of Exercise: 10:00       Symptoms: SOB       EKG Changes: No ischemic changes       DTS: 10       Risk Assessment: Low    Echo: 3/5/2025       LV: LV moderately dilated, LVSF lower limit of normal, EF 50%, basal inferoseptal segment and basal inferior segment abnormal       RV: Normal       LA: Mildly dilated       RA: Normal       Mitral Valve: Severe MR with prolapse of both leaflets.       Aortic Valve: Normal       Tricuspid Valve: Trace TR       Pulmonic Valve: Mild [place]       Aorta: Aortic root at sinus of valsalva 4.2cm,       Pericardium: No Pericardial effusion seen.    Antianginal Therapies:        Beta Blockers: N/A       Calcium Channel Blockers: N/A       Long Acting Nitrates: N/A       Ranexa: N/A    Associated Risk Factors:        Frailty: N/A       Cerebrovascular Disease: N/A       Chronic Lung Disease: N/A       Peripheral Arterial Disease: N/A       Chronic Kidney Disease (if yes, what is GFR): N/A       Uncontrolled Diabetes (if yes, what is HgbA1C or FBS): N/A       Poorly Controlled Hypertension (if yes, what is SBP): N/A       Morbid Obesity (if yes, what is BMI): N/A       History of Recent Ventricular Arrhythmia: N/A       Inability to Ambulate Safely: N/A       Need for Therapeutic Anticoagulation: N/A       Antiplatelet or Contrast Allergy: N/A    Social History:        Marital:        Occupation:        Tobacco:        ETOH:        Drugs:        Caffeine:        Diet:        Exercise:     ROS:   General: No fevers/chills. No fatigue  HEENT: No hearing loss. No visual disturbances. No headaches. No epistaxis.  Pulmonary: No dyspnea. No wheeze. No cough.  CV: No chest pain. No GUEVARA. No palpitations. No orthopnea. No PND. No edema.  GI: No BRBPR. No melena. No nausea.  : No hematuria.  Neuro: No weakness. No paresthesia. No syncope.  M/S: No back pain. No joint pain.  Heme: No bruising/bleeding problem.    T(C): --  HR: --  BP: --  RR: --  SpO2: --  Physical Exam:   General: Awake, alert, speech clear, no acute distress.  Neck: No bruit, no JVD.  Chest: S1, S2. No murmur. CTA.  Abdomen: Soft. Nondistended.  Extremities: No edema. Pulses: DP: Right: 2+, Left: 2+, Radial: Right: 2+, Left: 2+    EKG:     Labs:    66y/o male never smoker PMH MR, MVP, PAF (s/p ablation 2013 and 2016, 8 DCCV last one in 2024, not on AC), cardiomyopathy who presented to CTS Dr. Call to MV evaluation. In 2019, was noted to have moderately severe eccentric MR with moderate MVP of both leaflets. Although he reported no symptoms to his cardiologist, MR was noted to progress to severe with EF 50% and was referred for surgical evaluation. Reports an increase in palpitations when lying on the left side. Presents for LHC to evaluate coronary anatomy and MATT to evaluate MR/MVP.     Symptoms:        Angina (Class): N/A       Ischemic Symptoms: N/A    Heart Failure: Chronic ACC/AHA stage C, HFimpEF    Assessment of LVEF:       EF: 50%       Assessed by: Echo       Date: 3/5/2025    Prior Cardiac Interventions:  LHC/PCI's: N/A    CT Surgery: N/A    EP:   ·	AF ablation X 2 (most recent 1/2018 @ [facility])  ·	Multiple prior cardioversion most recently 1/2020 at [facility]    Noninvasive Testing:   Cardiac CTA Calcium Score: 11/15/2024  LM: (17) The left main coronary artery is narrowed by less than 25% due to due to calcified plaque  The left main trifurcates into LAD, LCx and ramus intermedius.  The ramus intermedius demonstrates no significant stenosis or plaque.  LAD: (60) There are two diagonal branches. Distal vessel wraps around apex.  ·	Proximal: Minimal luminal narrowing due to calcified plaque.  ·	Mid: Normal .  ·	Distal: Normal .  ·	First diagonal: Normal .  ·	Second diagonal: Normal .  LCX: (18) There are one obtuse marginal branches.  ·	Proximal: Minimal luminal narrowing due to calcified plaque.  ·	Mid/Distal: Normal .  ·	First obtuse marginal: Normal .  RCA: (0)  ·	Proximal: Normal .  ·	Mid: Normal .  ·	Distal: Normal .  Total: 114    Stress Test:        Protocol: Dread       Duration of Exercise: 10:00       Symptoms: SOB       EKG Changes: No ischemic changes       DTS: 10       Risk Assessment: Low    Echo: 3/5/2025       LV: LV moderately dilated, LVSF lower limit of normal, EF 50%, basal inferoseptal segment and basal inferior segment abnormal       RV: Normal       LA: Mildly dilated       RA: Normal       Mitral Valve: Severe MR with prolapse of both leaflets.       Aortic Valve: Normal       Tricuspid Valve: Trace TR       Pulmonic Valve: Mild KS       Aorta: Aortic root at sinus of valsalva 4.2cm,       Pericardium: No Pericardial effusion seen.    Antianginal Therapies:        Beta Blockers: N/A       Calcium Channel Blockers: N/A       Long Acting Nitrates: N/A       Ranexa: N/A    Associated Risk Factors:        Frailty: N/A       Cerebrovascular Disease: N/A       Chronic Lung Disease: N/A       Peripheral Arterial Disease: N/A       Chronic Kidney Disease (if yes, what is GFR): N/A       Uncontrolled Diabetes (if yes, what is HgbA1C or FBS): N/A       Poorly Controlled Hypertension (if yes, what is SBP): N/A       Morbid Obesity (if yes, what is BMI): N/A       History of Recent Ventricular Arrhythmia: N/A       Inability to Ambulate Safely: N/A       Need for Therapeutic Anticoagulation: N/A       Antiplatelet or Contrast Allergy: N/A    Social History:        Marital: , lives with wife       Occupation: Retired retail       Tobacco:        ETOH:        Drugs:        Caffeine:        Diet:        Exercise:     ROS:   General: No fevers/chills. No fatigue  HEENT: No hearing loss. No visual disturbances. No headaches. No epistaxis.  Pulmonary: No dyspnea. No wheeze. No cough.  CV: No chest pain. No GUEVARA. No palpitations. No orthopnea. No PND. No edema.  GI: No BRBPR. No melena. No nausea.  : No hematuria.  Neuro: No weakness. No paresthesia. No syncope.  M/S: No back pain. No joint pain.  Heme: No bruising/bleeding problem.    T(C): --  HR: --  BP: --  RR: --  SpO2: --  Physical Exam:   General: Awake, alert, speech clear, no acute distress.  Neck: No bruit, no JVD.  Chest: S1, S2. No murmur. CTA.  Abdomen: Soft. Nondistended.  Extremities: No edema. Pulses: DP: Right: 2+, Left: 2+, Radial: Right: 2+, Left: 2+    EKG:     Labs:    66y/o male never smoker PMH MR, MVP, PAF (s/p ablation 2013 and 2016, 8 DCCV last one in 2024, not on AC), cardiomyopathy who presented to CTS Dr. Call to MV evaluation. In 2019, was noted to have moderately severe eccentric MR with moderate MVP of both leaflets. Although he reported no symptoms to his cardiologist, MR was noted to progress to severe with EF 50% and was referred for surgical evaluation. Reports an increase in palpitations when lying on the left side.      Symptoms:        Angina (Class): N/A       Ischemic Symptoms: N/A    Heart Failure: Chronic ACC/AHA stage C, HFimpEF    Assessment of LVEF:       EF: 50%       Assessed by: Echo       Date: 3/5/2025    Prior Cardiac Interventions:  LHC/PCI's: N/A    CT Surgery: N/A    EP:   ·	AF ablation X 2 (most recent 1/2018 @ [facility])  ·	Multiple prior cardioversion most recently 1/2020 at [facility]    Noninvasive Testing:   Cardiac CTA Calcium Score: 11/15/2024  LM: (17) The left main coronary artery is narrowed by less than 25% due to due to calcified plaque  The left main trifurcates into LAD, LCx and ramus intermedius.  The ramus intermedius demonstrates no significant stenosis or plaque.  LAD: (60) There are two diagonal branches. Distal vessel wraps around apex.  ·	Proximal: Minimal luminal narrowing due to calcified plaque.  ·	Mid: Normal .  ·	Distal: Normal .  ·	First diagonal: Normal .  ·	Second diagonal: Normal .  LCX: (18) There are one obtuse marginal branches.  ·	Proximal: Minimal luminal narrowing due to calcified plaque.  ·	Mid/Distal: Normal .  ·	First obtuse marginal: Normal .  RCA: (0)  ·	Proximal: Normal .  ·	Mid: Normal .  ·	Distal: Normal .  Total: 114    Stress Test:        Protocol: Dread       Duration of Exercise: 10:00       Symptoms: SOB       EKG Changes: No ischemic changes       DTS: 10       Risk Assessment: Low    Echo: 3/5/2025       LV: LV moderately dilated, LVSF lower limit of normal, EF 50%, basal inferoseptal segment and basal inferior segment abnormal       RV: Normal       LA: Mildly dilated       RA: Normal       Mitral Valve: Severe MR with prolapse of both leaflets.       Aortic Valve: Normal       Tricuspid Valve: Trace TR       Pulmonic Valve: Mild DE       Aorta: Aortic root at sinus of valsalva 4.2cm,       Pericardium: No Pericardial effusion seen.    Antianginal Therapies:        Beta Blockers: Toprol 25 mg daily       Calcium Channel Blockers: N/A       Long Acting Nitrates: N/A       Ranexa: N/A    Associated Risk Factors:        Frailty: N/A       Cerebrovascular Disease: N/A       Chronic Lung Disease: N/A       Peripheral Arterial Disease: N/A       Chronic Kidney Disease (if yes, what is GFR): N/A       Uncontrolled Diabetes (if yes, what is HgbA1C or FBS): N/A       Poorly Controlled Hypertension (if yes, what is SBP): N/A       Morbid Obesity (if yes, what is BMI): N/A       History of Recent Ventricular Arrhythmia: N/A       Inability to Ambulate Safely: N/A       Need for Therapeutic Anticoagulation: N/A       Antiplatelet or Contrast Allergy: N/A    Social History:        Marital: , lives with wife       Occupation: Retired retail       Tobacco: Never smoker       ETOH: 6-8 drinks/week       Drugs: Occasional marijuana       Caffeine: 1/2 caf     ROS:   General: No fevers/chills. No fatigue  HEENT: No hearing loss. No visual disturbances. No headaches. No epistaxis.  Pulmonary: No dyspnea. No wheeze. No cough.  CV: No chest pain. No GUEVARA. Occasional palpitations. No orthopnea. No PND. No edema.  GI: No BRBPR. No melena. No nausea.  : No hematuria.  Neuro: No weakness. No paresthesia. No syncope.  M/S: No back pain. No joint pain.  Heme: No bruising/bleeding problem.    T(C): 36.8 (06-06-25 @ 08:31), Max: 36.8 (06-06-25 @ 08:31)  HR: 61 (06-06-25 @ 08:31)  BP: 143/95 (06-06-25 @ 08:31)  RR: 16 (06-06-25 @ 08:31)  SpO2: 99% (06-06-25 @ 08:31)  Physical Exam:   General: Awake, alert, speech clear, no acute distress.  Neck: No bruit, no JVD.  Chest: S1, S2. No murmur. CTA.  Abdomen: Soft. Nondistended.  Extremities: No edema. Pulses: DP: Right: 2+, Left: 2+, Radial: Right: 2+, Left: 2+    EKG:     Labs:                         14.7   4.24  )-----------( 168      ( 06 Jun 2025 08:40 )             42.3     06-06    139  |  102  |  23.2[H]  ----------------------------<  90  4.0   |  25.0  |  0.79    Ca    9.0      06 Jun 2025 08:40  Mg     2.1     06-06    TPro  6.9  /  Alb  4.2  /  TBili  0.8  /  DBili  x   /  AST  38  /  ALT  30  /  AlkPhos  53  06-06

## 2025-06-05 NOTE — H&P PST ADULT - NSICDXPASTMEDICALHX_GEN_ALL_CORE_FT
PAST MEDICAL HISTORY:  AF (atrial fibrillation)     Mitral regurgitation     MVP (mitral valve prolapse)

## 2025-06-06 ENCOUNTER — OUTPATIENT (OUTPATIENT)
Dept: OUTPATIENT SERVICES | Facility: HOSPITAL | Age: 65
LOS: 1 days | End: 2025-06-06
Payer: COMMERCIAL

## 2025-06-06 ENCOUNTER — RESULT REVIEW (OUTPATIENT)
Age: 65
End: 2025-06-06

## 2025-06-06 ENCOUNTER — TRANSCRIPTION ENCOUNTER (OUTPATIENT)
Age: 65
End: 2025-06-06

## 2025-06-06 VITALS
DIASTOLIC BLOOD PRESSURE: 95 MMHG | OXYGEN SATURATION: 99 % | SYSTOLIC BLOOD PRESSURE: 143 MMHG | HEART RATE: 61 BPM | WEIGHT: 195.11 LBS | RESPIRATION RATE: 16 BRPM | TEMPERATURE: 98 F | HEIGHT: 73 IN

## 2025-06-06 VITALS
HEART RATE: 53 BPM | OXYGEN SATURATION: 97 % | RESPIRATION RATE: 16 BRPM | SYSTOLIC BLOOD PRESSURE: 142 MMHG | DIASTOLIC BLOOD PRESSURE: 92 MMHG

## 2025-06-06 DIAGNOSIS — I34.0 NONRHEUMATIC MITRAL (VALVE) INSUFFICIENCY: ICD-10-CM

## 2025-06-06 LAB
ALBUMIN SERPL ELPH-MCNC: 4.2 G/DL — SIGNIFICANT CHANGE UP (ref 3.3–5.2)
ALP SERPL-CCNC: 53 U/L — SIGNIFICANT CHANGE UP (ref 40–120)
ALT FLD-CCNC: 30 U/L — SIGNIFICANT CHANGE UP
ANION GAP SERPL CALC-SCNC: 12 MMOL/L — SIGNIFICANT CHANGE UP (ref 5–17)
AST SERPL-CCNC: 38 U/L — SIGNIFICANT CHANGE UP
BILIRUB SERPL-MCNC: 0.8 MG/DL — SIGNIFICANT CHANGE UP (ref 0.4–2)
BUN SERPL-MCNC: 23.2 MG/DL — HIGH (ref 8–20)
CALCIUM SERPL-MCNC: 9 MG/DL — SIGNIFICANT CHANGE UP (ref 8.4–10.5)
CHLORIDE SERPL-SCNC: 102 MMOL/L — SIGNIFICANT CHANGE UP (ref 96–108)
CO2 SERPL-SCNC: 25 MMOL/L — SIGNIFICANT CHANGE UP (ref 22–29)
CREAT SERPL-MCNC: 0.79 MG/DL — SIGNIFICANT CHANGE UP (ref 0.5–1.3)
EGFR: 98 ML/MIN/1.73M2 — SIGNIFICANT CHANGE UP
EGFR: 98 ML/MIN/1.73M2 — SIGNIFICANT CHANGE UP
GLUCOSE SERPL-MCNC: 90 MG/DL — SIGNIFICANT CHANGE UP (ref 70–99)
HCT VFR BLD CALC: 42.3 % — SIGNIFICANT CHANGE UP (ref 39–50)
HGB BLD-MCNC: 14.7 G/DL — SIGNIFICANT CHANGE UP (ref 13–17)
MAGNESIUM SERPL-MCNC: 2.1 MG/DL — SIGNIFICANT CHANGE UP (ref 1.6–2.6)
MCHC RBC-ENTMCNC: 31.7 PG — SIGNIFICANT CHANGE UP (ref 27–34)
MCHC RBC-ENTMCNC: 34.8 G/DL — SIGNIFICANT CHANGE UP (ref 32–36)
MCV RBC AUTO: 91.2 FL — SIGNIFICANT CHANGE UP (ref 80–100)
NRBC # BLD AUTO: 0 K/UL — SIGNIFICANT CHANGE UP (ref 0–0)
NRBC # FLD: 0 K/UL — SIGNIFICANT CHANGE UP (ref 0–0)
NRBC BLD AUTO-RTO: 0 /100 WBCS — SIGNIFICANT CHANGE UP (ref 0–0)
PLATELET # BLD AUTO: 168 K/UL — SIGNIFICANT CHANGE UP (ref 150–400)
PMV BLD: 9.2 FL — SIGNIFICANT CHANGE UP (ref 7–13)
POTASSIUM SERPL-MCNC: 4 MMOL/L — SIGNIFICANT CHANGE UP (ref 3.5–5.3)
POTASSIUM SERPL-SCNC: 4 MMOL/L — SIGNIFICANT CHANGE UP (ref 3.5–5.3)
PROT SERPL-MCNC: 6.9 G/DL — SIGNIFICANT CHANGE UP (ref 6.6–8.7)
RBC # BLD: 4.64 M/UL — SIGNIFICANT CHANGE UP (ref 4.2–5.8)
RBC # FLD: 12.4 % — SIGNIFICANT CHANGE UP (ref 10.3–14.5)
SODIUM SERPL-SCNC: 139 MMOL/L — SIGNIFICANT CHANGE UP (ref 135–145)
WBC # BLD: 4.24 K/UL — SIGNIFICANT CHANGE UP (ref 3.8–10.5)
WBC # FLD AUTO: 4.24 K/UL — SIGNIFICANT CHANGE UP (ref 3.8–10.5)

## 2025-06-06 PROCEDURE — 83735 ASSAY OF MAGNESIUM: CPT

## 2025-06-06 PROCEDURE — 76376 3D RENDER W/INTRP POSTPROCES: CPT

## 2025-06-06 PROCEDURE — 36415 COLL VENOUS BLD VENIPUNCTURE: CPT

## 2025-06-06 PROCEDURE — 93325 DOPPLER ECHO COLOR FLOW MAPG: CPT | Mod: 26

## 2025-06-06 PROCEDURE — 93312 ECHO TRANSESOPHAGEAL: CPT | Mod: 26

## 2025-06-06 PROCEDURE — 85027 COMPLETE CBC AUTOMATED: CPT

## 2025-06-06 PROCEDURE — 93325 DOPPLER ECHO COLOR FLOW MAPG: CPT

## 2025-06-06 PROCEDURE — 93320 DOPPLER ECHO COMPLETE: CPT

## 2025-06-06 PROCEDURE — 80053 COMPREHEN METABOLIC PANEL: CPT

## 2025-06-06 PROCEDURE — C1769: CPT

## 2025-06-06 PROCEDURE — 93010 ELECTROCARDIOGRAM REPORT: CPT

## 2025-06-06 PROCEDURE — 93312 ECHO TRANSESOPHAGEAL: CPT

## 2025-06-06 PROCEDURE — C1887: CPT

## 2025-06-06 PROCEDURE — 93460 R&L HRT ART/VENTRICLE ANGIO: CPT

## 2025-06-06 PROCEDURE — 76376 3D RENDER W/INTRP POSTPROCES: CPT | Mod: 26

## 2025-06-06 PROCEDURE — C1894: CPT

## 2025-06-06 PROCEDURE — 93320 DOPPLER ECHO COMPLETE: CPT | Mod: 26

## 2025-06-06 PROCEDURE — 93005 ELECTROCARDIOGRAM TRACING: CPT

## 2025-06-06 RX ORDER — ATORVASTATIN CALCIUM 80 MG/1
1 TABLET, FILM COATED ORAL
Qty: 90 | Refills: 0
Start: 2025-06-06 | End: 2025-09-03

## 2025-06-06 RX ORDER — ASPIRIN 325 MG
81 TABLET ORAL ONCE
Refills: 0 | Status: ACTIVE | OUTPATIENT
Start: 2025-06-06

## 2025-06-06 RX ORDER — ASPIRIN 325 MG
1 TABLET ORAL
Qty: 0 | Refills: 0 | DISCHARGE
Start: 2025-06-06

## 2025-06-06 RX ORDER — LOSARTAN POTASSIUM 100 MG/1
1 TABLET, FILM COATED ORAL
Qty: 90 | Refills: 0
Start: 2025-06-06 | End: 2025-09-03

## 2025-06-06 RX ORDER — METOPROLOL SUCCINATE 50 MG/1
1 TABLET, EXTENDED RELEASE ORAL
Refills: 0 | DISCHARGE

## 2025-06-06 NOTE — DISCHARGE NOTE PROVIDER - NSDCCPCAREPLAN_GEN_ALL_CORE_FT
PRINCIPAL DISCHARGE DIAGNOSIS  Diagnosis: Coronary artery disease involving native coronary artery of native heart without angina pectoris  Assessment and Plan of Treatment: GDMT:        APT: Will start aspirin 81 mg daily       Statin: Will start Lipitor 40 mg daily       Beta Blocker: Will continue Toprol 25 mg daily       Other Antianginals:        Aggressive cardiovascular risk reduction and lifestyle modification.  Follow up as an outpatient with Dr. Maricel Garrison and Dr. Dyllan Call and plan for treatment of MV and LAD.      SECONDARY DISCHARGE DIAGNOSES  Diagnosis: Moderate mitral valve regurgitation  Assessment and Plan of Treatment:   Follow up as an outpatient with Dr. Maricel Garrison and Dr. Dyllan Call and plan for treatment of MV and LAD.    Diagnosis: Heart failure with improved ejection fraction (HFimpEF)  Assessment and Plan of Treatment:   GDMT       Beta Blocker: Will continue Toprol 25 mg daily       RAAS Inhibitor: Will start losartan 25 mg daily       MRA: N/A       Diuretic: N/A       SGLT2i: Will start Farxiga 10 mg daily as an outpatient       Other: N/A  Strict I&O's  Daily standing weights (if able)

## 2025-06-06 NOTE — DISCHARGE NOTE PROVIDER - NSDCCPTREATMENT_GEN_ALL_CORE_FT
PRINCIPAL PROCEDURE  Procedure: Transesophageal echocardiogram  Findings and Treatment:   Left Ventricle: The left ventricular cavity is mildly dilated. Left ventricular systolic function is low normal with an ejection fraction visually estimated at 50 to 55%.  Right Ventricle: The right ventricular cavity is normal in size and right ventricular systolic function is normal.  Left Atrium: The left atrium is mildly dilated. There is no evidence of left atrial or left atrial appendage thrombus. The left atrial appendage emptying velocity is normal.  Right Atrium: The right atrium is normal in size.  Interatrial Septum: Agitated saline injection reveals bubbles in the left heart, A small size PFO with right-to-left shunting.  Aortic Valve: The aortic valve appears trileaflet with normal systolic excursion. There is no evidence of aortic regurgitation.  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  Tricuspid Valve: The tricuspid valve is structurally normal with normal leaflet excursion. There is no evidence of tricuspid stenosis. There is trace tricuspid regurgitation.  Pulmonic Valve: Structurally normal pulmonic valve with normal leaflet excursion. There is no pulmonic valve stenosis. There is mild pulmonic regurgitation.  Aorta: The aortic annulus, aortic root, ascending aorta, aortic arch and descending aorta appear normal in size.  Pericardium: No pericardial effusion seen.      SECONDARY PROCEDURE  Procedure: Left and right heart catheterization  Findings and Treatment:   Coronary Angiography   The coronary circulation is co-dominant.    LM   Left main artery: The segment is visually normal in size and structure. The segment is normal sized.  LAD   Left anterior descending artery: The segment is normal sized and moderately tortuous. The vessel contour is eccentric. There is an 80 % stenosis in the proximal third portion of the segment.    CX   Circumflex: The segment is large, co-dominant and moderately tortuous. Angiography shows minor irregularities.  RCA   Right coronary artery: The segment is medium, co-dominant and moderately tortuous. Angiography shows minor irregularities.  Ramus   Ramus intermedius: The segment is small to average size. Angiography shows minor irregularities.  Left Heart Cath   Left ventricular function was assessed. Global left ventricular function is mildly depressed. Ejection fraction was visually estimated by LV Gram with a value of 45%.   The left ventricle is mildly dilated.   LV to AO pullback was performed and there is no pressure gradient.   The left ventricular end diastolic pressure was 21 mmHg.     Valves   Mitral Valve: The mitral valve was evaluated by left ventriculography. The mitral valve exhibits moderate regurgitation.  Right Heart Pressures:       RA: 8       RV: 33/14       PA: 32/1 (19)       PCWP: 12       CO: 5.92 l/min        CI: 2.79 l/min/m2       SVR: 14.85 HUTCHINSON        PVR: 1.00 HUTCHINSON

## 2025-06-06 NOTE — PROGRESS NOTE ADULT - SUBJECTIVE AND OBJECTIVE BOX
Department of Cardiology                                                                  [facility]/[place] [address]  [place]                                                            Telephone: [phone]. Fax:[phone]                                                        INTERVENTIONAL CARDIOLOGY CATHETERIZATION NOTE     Subjective:  65y  Male who had a right and left heart catheterization and MATT which showed:  Coronary Angiography   ·	The coronary circulation is co-dominant.    LM   ·	Left main artery: The segment is visually normal in size and structure. The segment is normal sized.  LAD   ·	Left anterior descending artery: The segment is normal sized and moderately tortuous. The vessel contour is eccentric. There is an 80 % stenosis in the proximal third portion of the segment.    CX   ·	Circumflex: The segment is large, co-dominant and moderately tortuous. Angiography shows minor irregularities.  RCA   ·	Right coronary artery: The segment is medium, co-dominant and moderately tortuous. Angiography shows minor irregularities.  Ramus   ·	Ramus intermedius: The segment is small to average size. Angiography shows minor irregularities.  Left Heart Cath   ·	Left ventricular function was assessed. Global left ventricular function is mildly depressed. Ejection fraction was visually estimated by LV Gram with a value of 45%.   ·	The left ventricle is mildly dilated.   ·	LV to AO pullback was performed and there is no pressure gradient.   ·	The left ventricular end diastolic pressure was 21 mmHg.     Valves   ·	Mitral Valve: The mitral valve was evaluated by left ventriculography. The mitral valve exhibits moderate regurgitation.  Right Heart Pressures:       RA: 8       RV: 33/14       PA: 32/1 (19)       PCWP: 12       CO: 5.92 l/min        CI: 2.79 l/min/m2       SVR: 14.85 HUTCHINSON        PVR: 1.00 HUTCHINSON         Proceduralist: Burke Valle MD        Access/Hemostasis: Right radial artery (Radial band), right brachial sheath (manual pull)       Total Contrast: 93 mL Omnipaque       Total Heparin: 4,500 units       Sedation/Analgesia: N/A       IV Fluid: NS 50 mL       Antiplatelet Given: N/A       Other Medications: Verapamil 5 mg     MATT:   ·	Left Ventricle: The left ventricular cavity is mildly dilated. Left ventricular systolic function is low normal with an ejection fraction visually estimated at 50 to 55%.  ·	Right Ventricle: The right ventricular cavity is normal in size and right ventricular systolic function is normal.  ·	Left Atrium: The left atrium is mildly dilated. There is no evidence of left atrial or left atrial appendage thrombus. The left atrial appendage emptying velocity is normal.  ·	Right Atrium: The right atrium is normal in size.  ·	Interatrial Septum: Agitated saline injection reveals bubbles in the left heart, A small size PFO with right-to-left shunting.  ·	Aortic Valve: The aortic valve appears trileaflet with normal systolic excursion. There is no evidence of aortic regurgitation.  ·	Mitral Valve: Bileaflet mitral valve prolapse (Myxomatous Craig's in etiology) with at least 2 separate regurgitant jets, overall moderate regurgitation, no evidence of pulmonary veins systolic reversal.  ·	Tricuspid Valve: The tricuspid valve is structurally normal with normal leaflet excursion. There is no evidence of tricuspid stenosis. There is trace tricuspid regurgitation.  ·	Pulmonic Valve: Structurally normal pulmonic valve with normal leaflet excursion. There is no pulmonic valve stenosis. There is mild pulmonic regurgitation.  ·	Aorta: The aortic annulus, aortic root, ascending aorta, aortic arch and descending aorta appear normal in size.  ·	Pericardium: No pericardial effusion seen.    PAST MEDICAL & SURGICAL HISTORY:  AF (atrial fibrillation)  Mitral regurgitation  MVP (mitral valve prolapse)    FAMILY HISTORY: N/C    Home Medications:  metoprolol succinate 25 mg oral tablet, extended release: 1 tab(s) orally once a day (06 Jun 2025 08:49)  Multiple Vitamins oral tablet: 1 tab(s) orally once a day (06 Jun 2025 08:50)  Omega-3 1000 mg oral capsule: 1 cap(s) orally once a day (06 Jun 2025 08:50)    HPI: 66y/o male never smoker PMH MR, MVP, PAF (s/p ablation 2013 and 2016, 8 DCCV last one in 2024, not on AC), cardiomyopathy who [facility] Dr. Call to MV evaluation. In 2019, was noted to have moderately severe eccentric MR with moderate MVP of both leaflets. Although he reported no symptoms to his cardiologist, MR was noted to progress to severe with EF 50% and was referred for surgical evaluation. Reports an increase in palpitations when lying on the left side.      General: No fatigue, no fevers/chills  Respiratory: No dyspnea, no cough, no wheeze  CV: No chest pain, no palpitations  Abd: No nausea  Neuro: No headache, no dizziness    No Known Allergies    Objective:  Vital Signs Last 24 Hrs  T(C): 36.8 (06 Jun 2025 08:31), Max: 36.8 (06 Jun 2025 08:31)  T(F): 98.2 (06 Jun 2025 08:31), Max: 98.2 (06 Jun 2025 08:31)  HR: 61 (06 Jun 2025 08:31) (61 - 61)  BP: 143/95 (06 Jun 2025 08:31) (143/95 - 143/95)  RR: 16 (06 Jun 2025 08:31) (16 - 16)  SpO2: 99% (06 Jun 2025 08:31) (99% - 99%)    Parameters below as of 06 Jun 2025 08:31  Patient On (Oxygen Delivery Method): room air    CM: SR  Neuro: A&OX3, CN 2-12 intact  HEENT: NC, AT  Lungs: CTA B/L  CV: S1, S2, no murmur, RRR  Abd: Soft  Extremity: Right radial band and brachial sheath: no bleeding, fingers warm with good cap refil                            14.7   4.24  )-----------( 168      ( 06 Jun 2025 08:40 )             42.3     06-06    139  |  102  |  23.2[H]  ----------------------------<  90  4.0   |  25.0  |  0.79    Ca    9.0      06 Jun 2025 08:40  Mg     2.1     06-06    TPro  6.9  /  Alb  4.2  /  TBili  0.8  /  DBili  x   /  AST  38  /  ALT  30  /  AlkPhos  53  06-06

## 2025-06-06 NOTE — DISCHARGE NOTE PROVIDER - NSDCFUSCHEDAPPT_GEN_ALL_CORE_FT
[facility]  [facility] [address]  Scheduled Appointment: 06/17/2025

## 2025-06-06 NOTE — PROGRESS NOTE ADULT - ASSESSMENT
65y Male   Procedure: Left heart catheterization    1. S/P LHC:   ·	Remove radial band and brachial sheath at: 1:00 PM  ·	Wrist precautions explained.  ·	 mL IV  ·	Medications: Continue current medications.  ·	***PLEASE DO NOT ADMINISTER BLOOD TRANSFUSION ON THIS PATIENT WITHIN 72 HOURS OF CARDIAC CATHERIZATION (UNLESS PATIENT IS HEMODYNAMICALLY UNSTABLE OR ACTIVELY BLEEDING) WITHOUT FIRST DISCUSSING WITH INTERVENTIONALIST DR. WHEATLEY OR CALLING CATH LAB HOLDING [phone]***  ·	GDMT:   ·	     APT: Will start aspirin 81 mg daily  ·	     Statin: Will start Lipitor 40 mg daily  ·	     Beta Blocker: Will continue Toprol 25 mg daily  ·	     Other Antianginals:   ·	     Aggressive cardiovascular risk reduction and lifestyle modification.    2. Chronic ACC/AHA stage C, HFimpEF  ·	BP Range:   ·	GDMT  ·	     Beta Blocker: Will continue Toprol 25 mg daily  ·	     RAAS Inhibitor: Will start losartan 25 mg daily  ·	     MRA: N/A  ·	     Diuretic: N/A  ·	     SGLT2i: Will start Farxiga 10 mg daily as an outpatient  ·	     Other: N/A  ·	Strict I&O's  ·	Daily standing weights (if able)    3. MV regurgitation.  ·	Follow up as an outpatient with Dr. Maricel Garrison and Dr. Dyllan Call and plan for treatment of MV and LAD.    Discharge Planning:   ·	If OK, discharge home at: 2:00 PM  ·	Follow up as an outpatient with: Dr Maricel Garrison and Dr. Dyllan Call

## 2025-06-06 NOTE — DISCHARGE NOTE PROVIDER - CARE PROVIDER_API CALL
Dyllan Call  Thoracic and Cardiac Surgery  [address] [place] [id number]  Phone: [phone]  Fax: [phone]  Established Patient  Scheduled Appointment: 06/17/2025 08:00 PM    Cornelius Garrison  Cardiovascular Disease  [address] [place] [address]  Phone: [phone]  Fax: [phone]  Established Patient  Follow Up Time: 2 weeks

## 2025-06-06 NOTE — ASU PATIENT PROFILE, ADULT - FALL HARM RISK - UNIVERSAL INTERVENTIONS
Bed in lowest position, wheels locked, appropriate side rails in place/Call bell, personal items and telephone in reach/Instruct patient to call for assistance before getting out of bed or chair/Non-slip footwear when patient is out of bed/[place] to call system/Physically safe environment - no spills, clutter or unnecessary equipment/Purposeful Proactive Rounding/Room/bathroom lighting operational, light cord in reach

## 2025-06-06 NOTE — DISCHARGE NOTE NURSING/CASE MANAGEMENT/SOCIAL WORK - FINANCIAL ASSISTANCE
[facility] provides services at a reduced cost to those who are determined to be eligible through [facility]’s financial assistance program. Information regarding [facility]’s financial assistance program can be found by going to [email or web address] or by calling [phone].

## 2025-06-06 NOTE — DISCHARGE NOTE NURSING/CASE MANAGEMENT/SOCIAL WORK - PATIENT PORTAL LINK FT
You can access the FollowMyHealth Patient Portal offered by [facility] by registering at the following website: [email or web address]. By joining [employer]’s FollowMyHealth portal, you will also be able to view your health information using other applications (apps) compatible with our system.

## 2025-06-06 NOTE — DISCHARGE NOTE PROVIDER - CARE PROVIDERS DIRECT ADDRESSES
[email or web address]

## 2025-06-06 NOTE — DISCHARGE NOTE PROVIDER - NSDCMRMEDTOKEN_GEN_ALL_CORE_FT
aspirin 81 mg oral tablet, chewable: 1 tab(s) orally once  Lipitor 40 mg oral tablet: 1 tab(s) orally once a day (at bedtime)  losartan 25 mg oral tablet: 1 tab(s) orally once a day  metoprolol succinate 25 mg oral tablet, extended release: 1 tab(s) orally once a day  Multiple Vitamins oral tablet: 1 tab(s) orally once a day  Omega-3 1000 mg oral capsule: 1 cap(s) orally once a day

## 2025-06-06 NOTE — DISCHARGE NOTE NURSING/CASE MANAGEMENT/SOCIAL WORK - NSDCPEFALRISK_GEN_ALL_CORE
For information on Fall & Injury Prevention, visit: [email or web address] OR  [email or web address] OR  https://www.cdc.gov/steadi/patient.html

## 2025-06-17 ENCOUNTER — APPOINTMENT (OUTPATIENT)
Dept: CARDIOTHORACIC SURGERY | Facility: CLINIC | Age: 65
End: 2025-06-17

## 2025-06-17 VITALS
HEIGHT: 73 IN | OXYGEN SATURATION: 97 % | BODY MASS INDEX: 25.84 KG/M2 | SYSTOLIC BLOOD PRESSURE: 137 MMHG | RESPIRATION RATE: 16 BRPM | WEIGHT: 195 LBS | HEART RATE: 90 BPM | DIASTOLIC BLOOD PRESSURE: 95 MMHG

## 2025-06-17 PROCEDURE — 99214 OFFICE O/P EST MOD 30 MIN: CPT

## 2025-06-17 RX ORDER — APIXABAN 5 MG/1
5 TABLET, FILM COATED ORAL
Refills: 0 | Status: ACTIVE | COMMUNITY

## 2025-06-17 RX ORDER — MULTIVITAMIN
TABLET ORAL
Refills: 0 | Status: ACTIVE | COMMUNITY

## 2025-06-17 RX ORDER — METOPROLOL TARTRATE 50 MG/1
50 TABLET ORAL
Refills: 0 | Status: ACTIVE | COMMUNITY

## 2025-06-17 RX ORDER — LOSARTAN POTASSIUM 25 MG/1
25 TABLET, FILM COATED ORAL
Refills: 0 | Status: ACTIVE | COMMUNITY

## 2025-06-17 RX ORDER — ATORVASTATIN CALCIUM 40 MG/1
40 TABLET, FILM COATED ORAL
Refills: 0 | Status: ACTIVE | COMMUNITY

## 2025-06-24 PROBLEM — I34.0 NONRHEUMATIC MITRAL (VALVE) INSUFFICIENCY: Chronic | Status: ACTIVE | Noted: 2025-06-05

## 2025-06-26 PROBLEM — I25.10 ATHEROSCLEROSIS OF NATIVE CORONARY ARTERY OF NATIVE HEART WITHOUT ANGINA PECTORIS: Status: ACTIVE | Noted: 2025-06-26

## 2025-06-27 ENCOUNTER — RESULT REVIEW (OUTPATIENT)
Age: 65
End: 2025-06-27

## 2025-06-27 ENCOUNTER — APPOINTMENT (OUTPATIENT)
Dept: CARDIOTHORACIC SURGERY | Facility: CLINIC | Age: 65
End: 2025-06-27
Payer: MEDICARE

## 2025-06-27 ENCOUNTER — APPOINTMENT (OUTPATIENT)
Dept: ULTRASOUND IMAGING | Facility: CLINIC | Age: 65
End: 2025-06-27
Payer: MEDICARE

## 2025-06-27 ENCOUNTER — APPOINTMENT (OUTPATIENT)
Dept: PULMONOLOGY | Facility: CLINIC | Age: 65
End: 2025-06-27
Payer: MEDICARE

## 2025-06-27 VITALS
DIASTOLIC BLOOD PRESSURE: 97 MMHG | SYSTOLIC BLOOD PRESSURE: 129 MMHG | OXYGEN SATURATION: 99 % | HEART RATE: 92 BPM | RESPIRATION RATE: 16 BRPM

## 2025-06-27 VITALS — BODY MASS INDEX: 26.24 KG/M2 | HEIGHT: 72.75 IN | WEIGHT: 198 LBS

## 2025-06-27 PROCEDURE — 99212 OFFICE O/P EST SF 10 MIN: CPT

## 2025-06-27 PROCEDURE — 94727 GAS DIL/WSHOT DETER LNG VOL: CPT

## 2025-06-27 PROCEDURE — 94010 BREATHING CAPACITY TEST: CPT

## 2025-06-27 PROCEDURE — 93970 EXTREMITY STUDY: CPT

## 2025-06-27 PROCEDURE — 85018 HEMOGLOBIN: CPT | Mod: QW

## 2025-06-27 PROCEDURE — 93880 EXTRACRANIAL BILAT STUDY: CPT

## 2025-06-27 PROCEDURE — 94729 DIFFUSING CAPACITY: CPT

## 2025-07-08 ENCOUNTER — OUTPATIENT (OUTPATIENT)
Dept: OUTPATIENT SERVICES | Facility: HOSPITAL | Age: 65
LOS: 1 days | End: 2025-07-08
Payer: COMMERCIAL

## 2025-07-08 VITALS
RESPIRATION RATE: 18 BRPM | HEART RATE: 85 BPM | TEMPERATURE: 97 F | OXYGEN SATURATION: 98 % | SYSTOLIC BLOOD PRESSURE: 108 MMHG | HEIGHT: 73 IN | DIASTOLIC BLOOD PRESSURE: 64 MMHG | WEIGHT: 200.62 LBS

## 2025-07-08 DIAGNOSIS — Z01.818 ENCOUNTER FOR OTHER PREPROCEDURAL EXAMINATION: ICD-10-CM

## 2025-07-08 DIAGNOSIS — E78.5 HYPERLIPIDEMIA, UNSPECIFIED: ICD-10-CM

## 2025-07-08 DIAGNOSIS — I10 ESSENTIAL (PRIMARY) HYPERTENSION: ICD-10-CM

## 2025-07-08 DIAGNOSIS — I34.0 NONRHEUMATIC MITRAL (VALVE) INSUFFICIENCY: ICD-10-CM

## 2025-07-08 DIAGNOSIS — Z98.890 OTHER SPECIFIED POSTPROCEDURAL STATES: Chronic | ICD-10-CM

## 2025-07-08 DIAGNOSIS — I48.91 UNSPECIFIED ATRIAL FIBRILLATION: ICD-10-CM

## 2025-07-08 DIAGNOSIS — Z90.89 ACQUIRED ABSENCE OF OTHER ORGANS: Chronic | ICD-10-CM

## 2025-07-08 DIAGNOSIS — I25.10 ATHEROSCLEROTIC HEART DISEASE OF NATIVE CORONARY ARTERY WITHOUT ANGINA PECTORIS: ICD-10-CM

## 2025-07-08 DIAGNOSIS — Z29.9 ENCOUNTER FOR PROPHYLACTIC MEASURES, UNSPECIFIED: ICD-10-CM

## 2025-07-08 DIAGNOSIS — Z92.89 PERSONAL HISTORY OF OTHER MEDICAL TREATMENT: Chronic | ICD-10-CM

## 2025-07-08 LAB
A1C WITH ESTIMATED AVERAGE GLUCOSE RESULT: 5.5 % — SIGNIFICANT CHANGE UP (ref 4–5.6)
ALBUMIN SERPL ELPH-MCNC: 4 G/DL — SIGNIFICANT CHANGE UP (ref 3.3–5.2)
ALP SERPL-CCNC: 70 U/L — SIGNIFICANT CHANGE UP (ref 40–120)
ALT FLD-CCNC: 45 U/L — HIGH
ANION GAP SERPL CALC-SCNC: 12 MMOL/L — SIGNIFICANT CHANGE UP (ref 5–17)
APPEARANCE UR: CLEAR — SIGNIFICANT CHANGE UP
APTT BLD: 32.5 SEC — SIGNIFICANT CHANGE UP (ref 26.1–36.8)
AST SERPL-CCNC: 42 U/L — HIGH
BASOPHILS # BLD AUTO: 0.04 K/UL — SIGNIFICANT CHANGE UP (ref 0–0.2)
BASOPHILS NFR BLD AUTO: 0.7 % — SIGNIFICANT CHANGE UP (ref 0–2)
BILIRUB SERPL-MCNC: 0.6 MG/DL — SIGNIFICANT CHANGE UP (ref 0.4–2)
BILIRUB UR-MCNC: NEGATIVE — SIGNIFICANT CHANGE UP
BLD GP AB SCN SERPL QL: SIGNIFICANT CHANGE UP
BUN SERPL-MCNC: 17.6 MG/DL — SIGNIFICANT CHANGE UP (ref 8–20)
CALCIUM SERPL-MCNC: 9 MG/DL — SIGNIFICANT CHANGE UP (ref 8.4–10.5)
CHLORIDE SERPL-SCNC: 105 MMOL/L — SIGNIFICANT CHANGE UP (ref 96–108)
CO2 SERPL-SCNC: 21 MMOL/L — LOW (ref 22–29)
COLOR SPEC: YELLOW — SIGNIFICANT CHANGE UP
CREAT SERPL-MCNC: 0.8 MG/DL — SIGNIFICANT CHANGE UP (ref 0.5–1.3)
DIFF PNL FLD: NEGATIVE — SIGNIFICANT CHANGE UP
EGFR: 98 ML/MIN/1.73M2 — SIGNIFICANT CHANGE UP
EGFR: 98 ML/MIN/1.73M2 — SIGNIFICANT CHANGE UP
EOSINOPHIL # BLD AUTO: 0.18 K/UL — SIGNIFICANT CHANGE UP (ref 0–0.5)
EOSINOPHIL NFR BLD AUTO: 3 % — SIGNIFICANT CHANGE UP (ref 0–6)
ESTIMATED AVERAGE GLUCOSE: 111 MG/DL — SIGNIFICANT CHANGE UP (ref 68–114)
GLUCOSE SERPL-MCNC: 96 MG/DL — SIGNIFICANT CHANGE UP (ref 70–99)
GLUCOSE UR QL: NEGATIVE MG/DL — SIGNIFICANT CHANGE UP
HCT VFR BLD CALC: 42.4 % — SIGNIFICANT CHANGE UP (ref 39–50)
HGB BLD-MCNC: 14.6 G/DL — SIGNIFICANT CHANGE UP (ref 13–17)
IMM GRANULOCYTES # BLD AUTO: 0.01 K/UL — SIGNIFICANT CHANGE UP (ref 0–0.07)
IMM GRANULOCYTES NFR BLD AUTO: 0.2 % — SIGNIFICANT CHANGE UP (ref 0–0.9)
INR BLD: 1.3 RATIO — HIGH (ref 0.85–1.16)
KETONES UR QL: ABNORMAL MG/DL
LEUKOCYTE ESTERASE UR-ACNC: NEGATIVE — SIGNIFICANT CHANGE UP
LYMPHOCYTES # BLD AUTO: 2.36 K/UL — SIGNIFICANT CHANGE UP (ref 1–3.3)
LYMPHOCYTES NFR BLD AUTO: 39.7 % — SIGNIFICANT CHANGE UP (ref 13–44)
MAGNESIUM SERPL-MCNC: 2 MG/DL — SIGNIFICANT CHANGE UP (ref 1.6–2.6)
MCHC RBC-ENTMCNC: 31.7 PG — SIGNIFICANT CHANGE UP (ref 27–34)
MCHC RBC-ENTMCNC: 34.4 G/DL — SIGNIFICANT CHANGE UP (ref 32–36)
MCV RBC AUTO: 92 FL — SIGNIFICANT CHANGE UP (ref 80–100)
MONOCYTES # BLD AUTO: 0.52 K/UL — SIGNIFICANT CHANGE UP (ref 0–0.9)
MONOCYTES NFR BLD AUTO: 8.7 % — SIGNIFICANT CHANGE UP (ref 2–14)
MRSA PCR RESULT.: SIGNIFICANT CHANGE UP
NEUTROPHILS # BLD AUTO: 2.84 K/UL — SIGNIFICANT CHANGE UP (ref 1.8–7.4)
NEUTROPHILS NFR BLD AUTO: 47.7 % — SIGNIFICANT CHANGE UP (ref 43–77)
NITRITE UR-MCNC: NEGATIVE — SIGNIFICANT CHANGE UP
NRBC # BLD AUTO: 0 K/UL — SIGNIFICANT CHANGE UP (ref 0–0)
NRBC # FLD: 0 K/UL — SIGNIFICANT CHANGE UP (ref 0–0)
NRBC BLD AUTO-RTO: 0 /100 WBCS — SIGNIFICANT CHANGE UP (ref 0–0)
NT-PROBNP SERPL-SCNC: 908 PG/ML — HIGH (ref 0–300)
PH UR: 6.5 — SIGNIFICANT CHANGE UP (ref 5–8)
PHOSPHATE SERPL-MCNC: 3.3 MG/DL — SIGNIFICANT CHANGE UP (ref 2.4–4.7)
PLATELET # BLD AUTO: 169 K/UL — SIGNIFICANT CHANGE UP (ref 150–400)
PMV BLD: 10.2 FL — SIGNIFICANT CHANGE UP (ref 7–13)
POTASSIUM SERPL-MCNC: 4.3 MMOL/L — SIGNIFICANT CHANGE UP (ref 3.5–5.3)
POTASSIUM SERPL-SCNC: 4.3 MMOL/L — SIGNIFICANT CHANGE UP (ref 3.5–5.3)
PREALB SERPL-MCNC: 20 MG/DL — SIGNIFICANT CHANGE UP (ref 18–38)
PROT SERPL-MCNC: 6.6 G/DL — SIGNIFICANT CHANGE UP (ref 6.6–8.7)
PROT UR-MCNC: NEGATIVE MG/DL — SIGNIFICANT CHANGE UP
PROTHROM AB SERPL-ACNC: 14.6 SEC — HIGH (ref 9.9–13.4)
RBC # BLD: 4.61 M/UL — SIGNIFICANT CHANGE UP (ref 4.2–5.8)
RBC # FLD: 12.8 % — SIGNIFICANT CHANGE UP (ref 10.3–14.5)
S AUREUS DNA NOSE QL NAA+PROBE: SIGNIFICANT CHANGE UP
SODIUM SERPL-SCNC: 138 MMOL/L — SIGNIFICANT CHANGE UP (ref 135–145)
SP GR SPEC: 1.02 — SIGNIFICANT CHANGE UP (ref 1–1.03)
T3 SERPL-MCNC: 115 NG/DL — SIGNIFICANT CHANGE UP (ref 80–200)
T4 AB SER-ACNC: 6.6 UG/DL — SIGNIFICANT CHANGE UP (ref 4.5–12)
TSH SERPL-MCNC: 2.8 UIU/ML — SIGNIFICANT CHANGE UP (ref 0.27–4.2)
UROBILINOGEN FLD QL: 1 MG/DL — SIGNIFICANT CHANGE UP (ref 0.2–1)
WBC # BLD: 5.95 K/UL — SIGNIFICANT CHANGE UP (ref 3.8–10.5)
WBC # FLD AUTO: 5.95 K/UL — SIGNIFICANT CHANGE UP (ref 3.8–10.5)

## 2025-07-08 PROCEDURE — 36415 COLL VENOUS BLD VENIPUNCTURE: CPT

## 2025-07-08 PROCEDURE — 85025 COMPLETE CBC W/AUTO DIFF WBC: CPT

## 2025-07-08 PROCEDURE — 71046 X-RAY EXAM CHEST 2 VIEWS: CPT

## 2025-07-08 PROCEDURE — 86901 BLOOD TYPING SEROLOGIC RH(D): CPT

## 2025-07-08 PROCEDURE — 86850 RBC ANTIBODY SCREEN: CPT

## 2025-07-08 PROCEDURE — 93005 ELECTROCARDIOGRAM TRACING: CPT

## 2025-07-08 PROCEDURE — 83880 ASSAY OF NATRIURETIC PEPTIDE: CPT

## 2025-07-08 PROCEDURE — 87640 STAPH A DNA AMP PROBE: CPT

## 2025-07-08 PROCEDURE — 84134 ASSAY OF PREALBUMIN: CPT

## 2025-07-08 PROCEDURE — 71046 X-RAY EXAM CHEST 2 VIEWS: CPT | Mod: 26

## 2025-07-08 PROCEDURE — 93010 ELECTROCARDIOGRAM REPORT: CPT

## 2025-07-08 PROCEDURE — G0463: CPT

## 2025-07-08 PROCEDURE — 83036 HEMOGLOBIN GLYCOSYLATED A1C: CPT

## 2025-07-08 PROCEDURE — 86900 BLOOD TYPING SEROLOGIC ABO: CPT

## 2025-07-08 PROCEDURE — 84480 ASSAY TRIIODOTHYRONINE (T3): CPT

## 2025-07-08 PROCEDURE — 85610 PROTHROMBIN TIME: CPT

## 2025-07-08 PROCEDURE — 80053 COMPREHEN METABOLIC PANEL: CPT

## 2025-07-08 PROCEDURE — 85730 THROMBOPLASTIN TIME PARTIAL: CPT

## 2025-07-08 PROCEDURE — 81003 URINALYSIS AUTO W/O SCOPE: CPT

## 2025-07-08 PROCEDURE — 83735 ASSAY OF MAGNESIUM: CPT

## 2025-07-08 PROCEDURE — 87641 MR-STAPH DNA AMP PROBE: CPT

## 2025-07-08 PROCEDURE — 84436 ASSAY OF TOTAL THYROXINE: CPT

## 2025-07-08 PROCEDURE — 84443 ASSAY THYROID STIM HORMONE: CPT

## 2025-07-08 PROCEDURE — 84100 ASSAY OF PHOSPHORUS: CPT

## 2025-07-08 NOTE — H&P PST ADULT - CARDIOVASCULAR
normal/regular rate and rhythm/S1 S2 present/no gallops/no rub/no murmur/no pedal edema details… S1 S2 present/no gallops/no rub/no murmur/no pedal edema/irregular rate and rhythm

## 2025-07-08 NOTE — H&P PST ADULT - PROBLEM SELECTOR PLAN 3
-instructed to take metoprolol and losartan the day of surgery  -pending ekg  -follow up with cardiologist for routine management

## 2025-07-08 NOTE — H&P PST ADULT - PROBLEM SELECTOR PLAN 4
-continue atorvastatin as prescribed  -pending ekg  -follow up with cardiologist for routine management

## 2025-07-08 NOTE — H&P PST ADULT - HISTORY OF PRESENT ILLNESS
Erickson Avila is a 65 year old male with PMH significant for cardiomyopathy, MVR, paroxysmal atrial fibrillation for 20 years s/p ablation 2013, 2016,2018 s/p DCCV 2024. Patient reports known history of MVP for many years, monitored by echocardiograms. Most recent ECHO demonstrated evere with a Left ventricular ejection fraction of 50%. S/p cardiac cath 6/6/25 which demonstrated 80% stenosis to the LAD. Patient is an active 64 yo who is very active, lifts weights and walks 13,000 steps daily.       Cath Lab Report  Diagnostic Findings 6/6/25:     Coronary Angiography   The coronary circulation is co-dominant.      LM   Left main artery: The segment is visually normal in size and  structure. The segment is normal sized.    LAD   Left anterior descending artery: The segment is normal sized and  moderately tortuous. The vessel contour is eccentric. There  is an 80 % stenosis in the proximal third portion of the segment.      CX   Circumflex: The segment is large, co-dominant and moderately tortuous.  Angiography shows minor irregularities.    RCA   Right coronary artery: The segment is medium, co-dominant and  moderately tortuous. Angiography shows minor irregularities.    Ramus   Ramus intermedius: The segment is small to average size. Angiography  shows minor irregularities.    Left Heart Cath   Left ventricular function was assessed. Global left ventricular  function is mildly depressed. Ejection fraction was visually  estimated by LV Gram with a value of 45%. The left ventricle is mildly  dilated. LV to AO pullback was performed and there is no  pressure gradient. The left ventricular end diastolic pressure was 21  mmHg. LHC performed: Aortic valve crossed and left  ventricular pressures were obtained.    Valves   Mitral Valve: The mitral valve was evaluated by left ventriculography.  The mitral valve exhibits moderate regurgitation.    MATT W or WO Ultrasound Enhancing Agent 6/6/25  FINDINGS:    Left Ventricle:  The left ventricular cavity is mildly dilated. Left ventricular systolic function is low normal with an ejection fraction visually estimated at 50 to 55%.    Right Ventricle:  The right ventricular cavity is normal in size and right ventricular systolic function is normal.    Left Atrium:  The left atrium is mildly dilated. There is no evidence of left atrial or left atrial appendage thrombus. The left atrial appendage emptying velocity is normal.    Right Atrium:  The right atrium is normal in size.    Interatrial Septum:  Agitated saline injection reveals bubbles in the left heart, A small size PFO with right-to-left shunting.    Aortic Valve:  The aortic valve appears trileaflet with normal systolic excursion. There is no evidence of aortic regurgitation.    Mitral Valve:  Bileaflets mitral valve prolaspe (Myxomatous Craig's in etiology) with at least 2 separate regurgitant jets, overall moderate regurgitation, no evidence of pulmonary veins systolic reversal.    Tricuspid Valve:  The tricuspid valve is structurally normal with normal leaflet excursion. There is no evidence of tricuspid stenosis. There is trace tricuspid regurgitation.    Pulmonic Valve:  Structurally normal pulmonic valve with normal leaflet excursion. There is no pulmonic valve stenosis. There is mild pulmonic regurgitation.    Aorta:  The aortic annulus, aortic root, ascending aorta, aortic arch and descending aorta appear normal in size.    Pericardium:  No pericardial effusion seen.  ____________________________________________________________________  QUANTITATIVE DATA:  Left Ventricle Measurements: (Indexed to BSA)    Visualized LV EF%: 50 to 55%  ? Erickson Avila is a 65 year old male with PMH significant for cardiomyopathy, MVR, paroxysmal atrial fibrillation for 20 years s/p ablation 2013, 2016,2018 s/p DCCV 2024. Patient reports known history of MVP for many years, monitored by echocardiograms. Most recent ECHO demonstrated severe with a Left ventricular ejection fraction of 50%. S/p cardiac cath 6/6/25 which demonstrated 80% stenosis to the LAD. Patient reports experiencing fatigue and elevated HR on 6/10/25, apple watch EKG demonstrated atrial fibrillation. Patient is an active 66 yo who is very active, lifts weights and walks 13,000 steps daily. Admits to occasional palpitations and fatigue. Denies SOB, GUEVARA, or CP.       Cath Lab Report  Diagnostic Findings 6/6/25:     Coronary Angiography   The coronary circulation is co-dominant.      LM   Left main artery: The segment is visually normal in size and  structure. The segment is normal sized.    LAD   Left anterior descending artery: The segment is normal sized and  moderately tortuous. The vessel contour is eccentric. There  is an 80 % stenosis in the proximal third portion of the segment.      CX   Circumflex: The segment is large, co-dominant and moderately tortuous.  Angiography shows minor irregularities.    RCA   Right coronary artery: The segment is medium, co-dominant and  moderately tortuous. Angiography shows minor irregularities.    Ramus   Ramus intermedius: The segment is small to average size. Angiography  shows minor irregularities.    Left Heart Cath   Left ventricular function was assessed. Global left ventricular  function is mildly depressed. Ejection fraction was visually  estimated by LV Gram with a value of 45%. The left ventricle is mildly  dilated. LV to AO pullback was performed and there is no  pressure gradient. The left ventricular end diastolic pressure was 21  mmHg. LHC performed: Aortic valve crossed and left  ventricular pressures were obtained.    Valves   Mitral Valve: The mitral valve was evaluated by left ventriculography.  The mitral valve exhibits moderate regurgitation.    MATT W or WO Ultrasound Enhancing Agent 6/6/25  FINDINGS:    Left Ventricle:  The left ventricular cavity is mildly dilated. Left ventricular systolic function is low normal with an ejection fraction visually estimated at 50 to 55%.    Right Ventricle:  The right ventricular cavity is normal in size and right ventricular systolic function is normal.    Left Atrium:  The left atrium is mildly dilated. There is no evidence of left atrial or left atrial appendage thrombus. The left atrial appendage emptying velocity is normal.    Right Atrium:  The right atrium is normal in size.    Interatrial Septum:  Agitated saline injection reveals bubbles in the left heart, A small size PFO with right-to-left shunting.    Aortic Valve:  The aortic valve appears trileaflet with normal systolic excursion. There is no evidence of aortic regurgitation.    Mitral Valve:  Bileaflets mitral valve prolaspe (Myxomatous Craig's in etiology) with at least 2 separate regurgitant jets, overall moderate regurgitation, no evidence of pulmonary veins systolic reversal.    Tricuspid Valve:  The tricuspid valve is structurally normal with normal leaflet excursion. There is no evidence of tricuspid stenosis. There is trace tricuspid regurgitation.    Pulmonic Valve:  Structurally normal pulmonic valve with normal leaflet excursion. There is no pulmonic valve stenosis. There is mild pulmonic regurgitation.    Aorta:  The aortic annulus, aortic root, ascending aorta, aortic arch and descending aorta appear normal in size.    Pericardium:  No pericardial effusion seen.  ____________________________________________________________________  QUANTITATIVE DATA:  Left Ventricle Measurements: (Indexed to BSA)    Visualized LV EF%: 50 to 55%  ? Erickson Avila is a 65 year old male with PMH significant for cardiomyopathy, MVR, paroxysmal atrial fibrillation for 20 years s/p ablation 2013, 2016,2018 s/p DCCV 2024. Patient reports known history of MVP for many years, monitored by echocardiograms. Most recent ECHO demonstrated severe mitral regurgitation with a Left ventricular ejection fraction of 50%. S/p cardiac cath 6/6/25 which demonstrated 80% stenosis to the LAD. Patient reports experiencing fatigue and elevated HR on 6/10/25, apple watch EKG demonstrated atrial fibrillation which was later confirmed with cardiology. Patient is on Eliquis. Patient is an active 64 yo who is very active, lifts weights and walks 13,000 steps daily. Admits to occasional palpitations and fatigue. Denies SOB, GUEVARA, or CP. Patient was seen today at [facility] with mitral regurgitation, coronary artery disease, atrial fibrillation for scheduled mitral valve repair, possible replace coronary arty bypass graft x 1, maze ablation, left atrial appendage ligation with Dr. Call on 7/22/25.       Cath Lab Report  Diagnostic Findings 6/6/25:   Coronary Angiography   The coronary circulation is co-dominant.      LM   Left main artery: The segment is visually normal in size and  structure. The segment is normal sized.    LAD   Left anterior descending artery: The segment is normal sized and  moderately tortuous. The vessel contour is eccentric. There  is an 80 % stenosis in the proximal third portion of the segment.      CX   Circumflex: The segment is large, co-dominant and moderately tortuous.  Angiography shows minor irregularities.    RCA   Right coronary artery: The segment is medium, co-dominant and  moderately tortuous. Angiography shows minor irregularities.    Ramus   Ramus intermedius: The segment is small to average size. Angiography  shows minor irregularities.    Left Heart Cath   Left ventricular function was assessed. Global left ventricular  function is mildly depressed. Ejection fraction was visually  estimated by LV Gram with a value of 45%. The left ventricle is mildly  dilated. LV to AO pullback was performed and there is no  pressure gradient. The left ventricular end diastolic pressure was 21  mmHg. LHC performed: Aortic valve crossed and left  ventricular pressures were obtained.    Valves   Mitral Valve: The mitral valve was evaluated by left ventriculography.  The mitral valve exhibits moderate regurgitation.    MATT W or WO Ultrasound Enhancing Agent 6/6/25  FINDINGS:    Left Ventricle:  The left ventricular cavity is mildly dilated. Left ventricular systolic function is low normal with an ejection fraction visually estimated at 50 to 55%.    Right Ventricle:  The right ventricular cavity is normal in size and right ventricular systolic function is normal.    Left Atrium:  The left atrium is mildly dilated. There is no evidence of left atrial or left atrial appendage thrombus. The left atrial appendage emptying velocity is normal.    Right Atrium:  The right atrium is normal in size.    Interatrial Septum:  Agitated saline injection reveals bubbles in the left heart, A small size PFO with right-to-left shunting.    Aortic Valve:  The aortic valve appears trileaflet with normal systolic excursion. There is no evidence of aortic regurgitation.    Mitral Valve:  Bileaflets mitral valve prolaspe (Myxomatous Craig's in etiology) with at least 2 separate regurgitant jets, overall moderate regurgitation, no evidence of pulmonary veins systolic reversal.    Tricuspid Valve:  The tricuspid valve is structurally normal with normal leaflet excursion. There is no evidence of tricuspid stenosis. There is trace tricuspid regurgitation.    Pulmonic Valve:  Structurally normal pulmonic valve with normal leaflet excursion. There is no pulmonic valve stenosis. There is mild pulmonic regurgitation.    Aorta:  The aortic annulus, aortic root, ascending aorta, aortic arch and descending aorta appear normal in size.    Pericardium:  No pericardial effusion seen.  ____________________________________________________________________  QUANTITATIVE DATA:  Left Ventricle Measurements: (Indexed to BSA)    Visualized LV EF%: 50 to 55%  ?

## 2025-07-08 NOTE — H&P PST ADULT - PROBLEM SELECTOR PLAN 2
scheduled mitral valve repair, possible replace coronary arty bypass graft x 1, maze ablation, left atrial appendage ligation with Dr. Call on 7/22/25.     -pending labs, ekg, mrsa/mssa, cxr  -dental optimization

## 2025-07-08 NOTE — H&P PST ADULT - EKG AND INTERPRETATION
atrial fibrillation with RVR  low voltage QRS  nonspecific ST abnormality   abnormal QRS-T angle, consider primary T wave abnormality  pending official reading

## 2025-07-08 NOTE — H&P PST ADULT - OTHER CARE PROVIDERS
Dr. Arredondo ([facility] cardiology) Dr. Arredondo ([facility] cardiology), Dr. Arteaga (dentist)

## 2025-07-08 NOTE — H&P PST ADULT - PROBLEM SELECTOR PLAN 6
-Stop Eliquis 3 days prior to surgery as per Dr. Call  -Pending coagulation studies  -pending ekg  -follow up with cardiologist for routine management

## 2025-07-08 NOTE — H&P PST ADULT - NSICDXPASTMEDICALHX_GEN_ALL_CORE_FT
PAST MEDICAL HISTORY:  AF (atrial fibrillation)     CAD (coronary artery disease)     HLD (hyperlipidemia)     HTN (hypertension)     Mitral regurgitation     MVP (mitral valve prolapse)      PAST MEDICAL HISTORY:  AF (atrial fibrillation)     CAD (coronary artery disease)     HLD (hyperlipidemia)     HTN (hypertension)     Mitral regurgitation     MVP (mitral valve prolapse)     BONY (obstructive sleep apnea)

## 2025-07-08 NOTE — H&P PST ADULT - ASSESSMENT
-Patient educated on NPO after midnight except a sip of water with meds  -As per Dr. Call, stop Eliquis 3 days prior to surgery  -instructed to take losartan and metoprolol the day of surgery with a sip of water  -Educated on NSAIDS, multivitamins and herbals that increase the risk of bleeding and need to be stopped 5 days before procedure  -Educated on infection prevention  -Tylenol can be taken if needed for pain  -Medication reconciliation/Dr First reviewed with patient in conjunction with home medication list.  Dosages reviewed. No discrepancies found at time of PST visit. Patient instructed to follow up with either PST or surgeon's office if medications are added/changed prior to surgical procedure.  -Will continue all other medications as prescribed  -Verbalized understanding of all instructions.   Erickson Avila is a 65 year old male with PMH significant for cardiomyopathy, MVR, paroxysmal atrial fibrillation for 20 years s/p ablation , ,2018 s/p DCCV . Patient reports known history of MVP for many years, monitored by echocardiograms. Most recent ECHO demonstrated severe mitral regurgitation with a Left ventricular ejection fraction of 50%. S/p cardiac cath 25 which demonstrated 80% stenosis to the LAD. Patient reports experiencing fatigue and elevated HR on 6/10/25, apple watch EKG demonstrated atrial fibrillation which was later confirmed with cardiology. Patient is on Eliquis. Patient is an active 66 yo who is very active, lifts weights and walks 13,000 steps daily. Admits to occasional palpitations and fatigue. Denies SOB, GUEVARA, or CP. Patient was seen today at [facility] with mitral regurgitation, coronary artery disease, atrial fibrillation for scheduled mitral valve repair, possible replace coronary arty bypass graft x 1, maze ablation, left atrial appendage ligation with Dr. Call on 25.       -Patient educated on NPO after midnight except a sip of water with meds  -As per Dr. Call, stop Eliquis 3 days prior to surgery  -instructed to take losartan and metoprolol the day of surgery with a sip of water  -Educated on NSAIDS, multivitamins and herbals that increase the risk of bleeding and need to be stopped 5 days before procedure  -Educated on infection prevention  -Tylenol can be taken if needed for pain  -Medication reconciliation/Dr López reviewed with patient in conjunction with home medication list.  Dosages reviewed. No discrepancies found at time of PST visit. Patient instructed to follow up with either PST or surgeon's office if medications are added/changed prior to surgical procedure.  -Will continue all other medications as prescribed  -Verbalized understanding of all instructions.      CAPRINI SCORE    AGE RELATED RISK FACTORS                                                             [ ] Age 41-60 years                                            (1 Point)  [x ] Age: 61-74 years                                           (2 Points)                 [ ] Age= 75 years                                                (3 Points)             DISEASE RELATED RISK FACTORS                                                       [ ] Edema in the lower extremities                 (1 Point)                     [ ] Varicose veins                                               (1 Point)                                 [x ] BMI > 25 Kg/m2                                            (1 Point)                                  [ ] Serious infection (ie PNA)                            (1 Point)                     [ ] Lung disease ( COPD, Emphysema)            (1 Point)                                                                          [ ] Acute myocardial infarction                         (1 Point)                  [ ] Congestive heart failure (in the previous month)  (1 Point)         [ ] Inflammatory bowel disease                            (1 Point)                  [ ] Central venous access, PICC or Port               (2 points)       (within the last month)                                                                [ ] Stroke (in the previous month)                        (5 Points)    [ ] Previous or present malignancy                       (2 points)                                                                                                                                                         HEMATOLOGY RELATED FACTORS                                                         [ ] Prior episodes of VTE                                     (3 Points)                     [ ] Positive family history for VTE                      (3 Points)                  [ ] Prothrombin 76754 A                                     (3 Points)                     [ ] Factor V Leiden                                                (3 Points)                        [ ] Lupus anticoagulants                                      (3 Points)                                                           [ ] Anticardiolipin antibodies                              (3 Points)                                                       [ ] High homocysteine in the blood                   (3 Points)                                             [ ] Other congenital or acquired thrombophilia      (3 Points)                                                [ ] Heparin induced thrombocytopenia                  (3 Points)                                        MOBILITY RELATED FACTORS  [ ] Bed rest                                                         (1 Point)  [ ] Plaster cast                                                    (2 points)  [ ] Bed bound for more than 72 hours           (2 Points)    GENDER SPECIFIC FACTORS  [ ] Pregnancy or had a baby within the last month   (1 Point)  [ ] Post-partum < 6 weeks                                   (1 Point)  [ ] Hormonal therapy  or oral contraception   (1 Point)  [ ] History of pregnancy complications              (1 point)  [ ] Unexplained or recurrent              (1 Point)    OTHER RISK FACTORS                                           (1 Point)  [ ] BMI >40, smoking, diabetes requiring insulin, chemotherapy  blood transfusions and length of surgery over 2 hours    SURGERY RELATED RISK FACTORS  [ ]  Section within the last month     (1 Point)  [ ] Minor surgery                                                  (1 Point)  [ ] Arthroscopic surgery                                       (2 Points)  [x] Planned major surgery lasting more            (2 Points)      than 45 minutes     [ ] Elective hip or knee joint replacement       (5 points)       surgery                                                TRAUMA RELATED RISK FACTORS  [ ] Fracture of the hip, pelvis, or leg                       (5 Points)  [ ] Spinal cord injury resulting in paralysis             (5 points)       (in the previous month)    [ ] Paralysis  (less than 1 month)                             (5 Points)  [ ] Multiple Trauma within 1 month                        (5 Points)    Total Score [    5    ]    Caprini Score 0-2: Low Risk, NO VTE prophylaxis required for most patients, encourage ambulation  Caprini Score 3-6: Moderate Risk , pharmacologic VTE prophylaxis is indicated for most patients (in the absence of contraindications)  Caprini Score Greater than or =7: High risk, pharmocologic VTE prophylaxis indicated for most patients (in the absence of contraindications)      OPIOID RISK TOOL    SCOTT EACH BOX THAT APPLIES AND ADD TOTALS AT THE END    FAMILY HISTORY OF SUBSTANCE ABUSE                 FEMALE         MALE                                                Alcohol                             [  ]1 pt          [  ]3pts                                               Illegal Durgs                     [  ]2 pts        [  ]3pts                                               Rx Drugs                           [  ]4 pts        [  ]4 pts    PERSONAL HISTORY OF SUBSTANCE ABUSE                                                                                          Alcohol                             [  ]3 pts       [  ]3 pts                                               Illegal Drugs                     [  ]4 pts        [  ]4 pts                                               Rx Drugs                           [  ]5 pts        [  ]5 pts    AGE BETWEEN 16-45 YEARS                                      [  ]1 pt         [  ]1 pt    HISTORY OF PREADOLESCENT   SEXUAL ABUSE                                                             [  ]3 pts        [  ]0pts    PSYCHOLOGICAL DISEASE                     ADD, OCD, Bipolar, Schizophrenia        [  ]2 pts         [  ]2 pts                      Depression                                               [  ]1 pt           [  ]1 pt           SCORING TOTAL   (add numbers and type here)              (0)                                     A score of 3 or lower indicated LOW risk for future opioid abuse  A score of 4 to 7 indicated moderate risk for future opioid abuse  A score of 8 or higher indicates a high risk for opioid abuse

## 2025-07-08 NOTE — H&P PST ADULT - NSICDXFAMILYHX_GEN_ALL_CORE_FT
FAMILY HISTORY:  Father  Still living? No  Family history of diabetes mellitus (DM), Age at diagnosis: Age Unknown    Sibling  Still living? Unknown  FH: atrial fibrillation, Age at diagnosis: Age Unknown

## 2025-07-09 LAB
CULTURE RESULTS: SIGNIFICANT CHANGE UP
SPECIMEN SOURCE: SIGNIFICANT CHANGE UP

## 2025-07-22 ENCOUNTER — INPATIENT (INPATIENT)
Facility: HOSPITAL | Age: 65
LOS: 4 days | Discharge: HOME | DRG: 307 | End: 2025-07-27
Attending: THORACIC SURGERY (CARDIOTHORACIC VASCULAR SURGERY) | Admitting: THORACIC SURGERY (CARDIOTHORACIC VASCULAR SURGERY)
Payer: COMMERCIAL

## 2025-07-22 ENCOUNTER — RESULT REVIEW (OUTPATIENT)
Age: 65
End: 2025-07-22

## 2025-07-22 ENCOUNTER — APPOINTMENT (OUTPATIENT)
Dept: CARDIOTHORACIC SURGERY | Facility: HOSPITAL | Age: 65
End: 2025-07-22

## 2025-07-22 VITALS
HEART RATE: 71 BPM | OXYGEN SATURATION: 99 % | DIASTOLIC BLOOD PRESSURE: 98 MMHG | SYSTOLIC BLOOD PRESSURE: 120 MMHG | HEIGHT: 73 IN | RESPIRATION RATE: 20 BRPM | TEMPERATURE: 98 F | WEIGHT: 200.62 LBS

## 2025-07-22 DIAGNOSIS — Z98.890 OTHER SPECIFIED POSTPROCEDURAL STATES: Chronic | ICD-10-CM

## 2025-07-22 DIAGNOSIS — I34.0 NONRHEUMATIC MITRAL (VALVE) INSUFFICIENCY: ICD-10-CM

## 2025-07-22 DIAGNOSIS — Z90.89 ACQUIRED ABSENCE OF OTHER ORGANS: Chronic | ICD-10-CM

## 2025-07-22 DIAGNOSIS — Z92.89 PERSONAL HISTORY OF OTHER MEDICAL TREATMENT: Chronic | ICD-10-CM

## 2025-07-22 LAB
ABO RH CONFIRMATION: SIGNIFICANT CHANGE UP
ALBUMIN SERPL ELPH-MCNC: 3.5 G/DL — SIGNIFICANT CHANGE UP (ref 3.3–5.2)
ALP SERPL-CCNC: 51 U/L — SIGNIFICANT CHANGE UP (ref 40–120)
ALT FLD-CCNC: 43 U/L — HIGH
ANION GAP SERPL CALC-SCNC: 12 MMOL/L — SIGNIFICANT CHANGE UP (ref 5–17)
ANION GAP SERPL CALC-SCNC: 19 MMOL/L — HIGH (ref 5–17)
APTT BLD: 29.8 SEC — SIGNIFICANT CHANGE UP (ref 26.1–36.8)
APTT BLD: 31.6 SEC — SIGNIFICANT CHANGE UP (ref 26.1–36.8)
AST SERPL-CCNC: 72 U/L — HIGH
BASE EXCESS BLDA CALC-SCNC: -0.2 MMOL/L — SIGNIFICANT CHANGE UP (ref -2–3)
BASE EXCESS BLDA CALC-SCNC: -0.4 MMOL/L — SIGNIFICANT CHANGE UP (ref -2–3)
BASE EXCESS BLDA CALC-SCNC: -3 MMOL/L — LOW (ref -2–3)
BASE EXCESS BLDA CALC-SCNC: -3.5 MMOL/L — LOW (ref -2–3)
BASE EXCESS BLDA CALC-SCNC: -3.5 MMOL/L — LOW (ref -2–3)
BASE EXCESS BLDA CALC-SCNC: -5.1 MMOL/L — LOW (ref -2–3)
BASE EXCESS BLDA CALC-SCNC: -5.3 MMOL/L — LOW (ref -2–3)
BASE EXCESS BLDA CALC-SCNC: 0.1 MMOL/L — SIGNIFICANT CHANGE UP (ref -2–3)
BASE EXCESS BLDV CALC-SCNC: -0.4 MMOL/L — SIGNIFICANT CHANGE UP (ref -2–3)
BASE EXCESS BLDV CALC-SCNC: -3.9 MMOL/L — LOW (ref -2–3)
BASE EXCESS BLDV CALC-SCNC: -4.9 MMOL/L — LOW (ref -2–3)
BASE EXCESS BLDV CALC-SCNC: -5.2 MMOL/L — LOW (ref -2–3)
BASOPHILS # BLD AUTO: 0.04 K/UL — SIGNIFICANT CHANGE UP (ref 0–0.2)
BASOPHILS NFR BLD AUTO: 0.3 % — SIGNIFICANT CHANGE UP (ref 0–2)
BILIRUB SERPL-MCNC: 1.2 MG/DL — SIGNIFICANT CHANGE UP (ref 0.4–2)
BUN SERPL-MCNC: 21.1 MG/DL — HIGH (ref 8–20)
BUN SERPL-MCNC: 21.6 MG/DL — HIGH (ref 8–20)
CA-I BLDA-SCNC: 1 MMOL/L — LOW (ref 1.15–1.33)
CA-I BLDA-SCNC: 1.03 MMOL/L — LOW (ref 1.15–1.33)
CA-I BLDA-SCNC: 1.04 MMOL/L — LOW (ref 1.15–1.33)
CA-I BLDA-SCNC: 1.13 MMOL/L — LOW (ref 1.15–1.33)
CA-I BLDA-SCNC: 1.17 MMOL/L — SIGNIFICANT CHANGE UP (ref 1.15–1.33)
CA-I BLDA-SCNC: 1.21 MMOL/L — SIGNIFICANT CHANGE UP (ref 1.15–1.33)
CA-I BLDA-SCNC: 1.22 MMOL/L — SIGNIFICANT CHANGE UP (ref 1.15–1.33)
CA-I BLDA-SCNC: 1.27 MMOL/L — SIGNIFICANT CHANGE UP (ref 1.15–1.33)
CA-I SERPL-SCNC: 1.03 MMOL/L — LOW (ref 1.15–1.33)
CA-I SERPL-SCNC: 1.03 MMOL/L — LOW (ref 1.15–1.33)
CA-I SERPL-SCNC: 1.04 MMOL/L — LOW (ref 1.15–1.33)
CA-I SERPL-SCNC: 1.26 MMOL/L — SIGNIFICANT CHANGE UP (ref 1.15–1.33)
CALCIUM SERPL-MCNC: 8.7 MG/DL — SIGNIFICANT CHANGE UP (ref 8.4–10.5)
CALCIUM SERPL-MCNC: 9 MG/DL — SIGNIFICANT CHANGE UP (ref 8.4–10.5)
CHLORIDE BLDA-SCNC: 106 MMOL/L — SIGNIFICANT CHANGE UP (ref 96–108)
CHLORIDE BLDA-SCNC: 107 MMOL/L — SIGNIFICANT CHANGE UP (ref 96–108)
CHLORIDE BLDA-SCNC: 108 MMOL/L — SIGNIFICANT CHANGE UP (ref 96–108)
CHLORIDE BLDA-SCNC: 109 MMOL/L — HIGH (ref 96–108)
CHLORIDE BLDA-SCNC: 110 MMOL/L — HIGH (ref 96–108)
CHLORIDE BLDA-SCNC: 110 MMOL/L — HIGH (ref 96–108)
CHLORIDE BLDV-SCNC: 105 MMOL/L — SIGNIFICANT CHANGE UP (ref 96–108)
CHLORIDE BLDV-SCNC: 106 MMOL/L — SIGNIFICANT CHANGE UP (ref 96–108)
CHLORIDE BLDV-SCNC: 107 MMOL/L — SIGNIFICANT CHANGE UP (ref 96–108)
CHLORIDE BLDV-SCNC: 109 MMOL/L — HIGH (ref 96–108)
CHLORIDE SERPL-SCNC: 105 MMOL/L — SIGNIFICANT CHANGE UP (ref 96–108)
CHLORIDE SERPL-SCNC: 108 MMOL/L — SIGNIFICANT CHANGE UP (ref 96–108)
CK MB CFR SERPL CALC: 42 NG/ML — HIGH (ref 0–6.7)
CK SERPL-CCNC: 374 U/L — HIGH (ref 30–200)
CO2 SERPL-SCNC: 19 MMOL/L — LOW (ref 22–29)
CO2 SERPL-SCNC: 20 MMOL/L — LOW (ref 22–29)
COHGB MFR BLDA: 0.9 % — SIGNIFICANT CHANGE UP
COHGB MFR BLDA: 1 % — SIGNIFICANT CHANGE UP
COHGB MFR BLDA: 1.2 % — SIGNIFICANT CHANGE UP
COHGB MFR BLDA: 1.3 % — SIGNIFICANT CHANGE UP
COHGB MFR BLDA: 1.3 % — SIGNIFICANT CHANGE UP
COHGB MFR BLDA: 1.6 % — SIGNIFICANT CHANGE UP
COHGB MFR BLDV: 1.6 % — SIGNIFICANT CHANGE UP
COHGB MFR BLDV: 1.6 % — SIGNIFICANT CHANGE UP
COHGB MFR BLDV: 1.7 % — SIGNIFICANT CHANGE UP
COHGB MFR BLDV: 1.8 % — SIGNIFICANT CHANGE UP
CREAT SERPL-MCNC: 0.69 MG/DL — SIGNIFICANT CHANGE UP (ref 0.5–1.3)
CREAT SERPL-MCNC: 0.84 MG/DL — SIGNIFICANT CHANGE UP (ref 0.5–1.3)
EGFR: 102 ML/MIN/1.73M2 — SIGNIFICANT CHANGE UP
EGFR: 102 ML/MIN/1.73M2 — SIGNIFICANT CHANGE UP
EGFR: 96 ML/MIN/1.73M2 — SIGNIFICANT CHANGE UP
EGFR: 96 ML/MIN/1.73M2 — SIGNIFICANT CHANGE UP
EOSINOPHIL # BLD AUTO: 0.08 K/UL — SIGNIFICANT CHANGE UP (ref 0–0.5)
EOSINOPHIL NFR BLD AUTO: 0.6 % — SIGNIFICANT CHANGE UP (ref 0–6)
FIBRINOGEN PPP-MCNC: 259 MG/DL — SIGNIFICANT CHANGE UP (ref 200–450)
GAS PNL BLDA: SIGNIFICANT CHANGE UP
GAS PNL BLDV: 133 MMOL/L — LOW (ref 136–145)
GAS PNL BLDV: 135 MMOL/L — LOW (ref 136–145)
GAS PNL BLDV: 136 MMOL/L — SIGNIFICANT CHANGE UP (ref 136–145)
GAS PNL BLDV: 138 MMOL/L — SIGNIFICANT CHANGE UP (ref 136–145)
GLUCOSE BLDA-MCNC: 112 MG/DL — HIGH (ref 70–99)
GLUCOSE BLDA-MCNC: 141 MG/DL — HIGH (ref 70–99)
GLUCOSE BLDA-MCNC: 150 MG/DL — HIGH (ref 70–99)
GLUCOSE BLDA-MCNC: 150 MG/DL — HIGH (ref 70–99)
GLUCOSE BLDA-MCNC: 151 MG/DL — HIGH (ref 70–99)
GLUCOSE BLDA-MCNC: 165 MG/DL — HIGH (ref 70–99)
GLUCOSE BLDA-MCNC: 166 MG/DL — HIGH (ref 70–99)
GLUCOSE BLDA-MCNC: 97 MG/DL — SIGNIFICANT CHANGE UP (ref 70–99)
GLUCOSE BLDC GLUCOMTR-MCNC: 105 MG/DL — HIGH (ref 70–99)
GLUCOSE BLDC GLUCOMTR-MCNC: 109 MG/DL — HIGH (ref 70–99)
GLUCOSE BLDC GLUCOMTR-MCNC: 116 MG/DL — HIGH (ref 70–99)
GLUCOSE BLDC GLUCOMTR-MCNC: 141 MG/DL — HIGH (ref 70–99)
GLUCOSE BLDC GLUCOMTR-MCNC: 165 MG/DL — HIGH (ref 70–99)
GLUCOSE BLDC GLUCOMTR-MCNC: 202 MG/DL — HIGH (ref 70–99)
GLUCOSE BLDC GLUCOMTR-MCNC: 224 MG/DL — HIGH (ref 70–99)
GLUCOSE BLDC GLUCOMTR-MCNC: 233 MG/DL — HIGH (ref 70–99)
GLUCOSE BLDV-MCNC: 116 MG/DL — HIGH (ref 70–99)
GLUCOSE BLDV-MCNC: 147 MG/DL — HIGH (ref 70–99)
GLUCOSE BLDV-MCNC: 164 MG/DL — HIGH (ref 70–99)
GLUCOSE BLDV-MCNC: 164 MG/DL — HIGH (ref 70–99)
GLUCOSE SERPL-MCNC: 164 MG/DL — HIGH (ref 70–99)
GLUCOSE SERPL-MCNC: 223 MG/DL — HIGH (ref 70–99)
HCO3 BLDA-SCNC: 21 MMOL/L — SIGNIFICANT CHANGE UP (ref 21–28)
HCO3 BLDA-SCNC: 22 MMOL/L — SIGNIFICANT CHANGE UP (ref 21–28)
HCO3 BLDA-SCNC: 23 MMOL/L — SIGNIFICANT CHANGE UP (ref 21–28)
HCO3 BLDA-SCNC: 25 MMOL/L — SIGNIFICANT CHANGE UP (ref 21–28)
HCO3 BLDV-SCNC: 21 MMOL/L — LOW (ref 22–29)
HCO3 BLDV-SCNC: 22 MMOL/L — SIGNIFICANT CHANGE UP (ref 22–29)
HCO3 BLDV-SCNC: 24 MMOL/L — SIGNIFICANT CHANGE UP (ref 22–29)
HCO3 BLDV-SCNC: 26 MMOL/L — SIGNIFICANT CHANGE UP (ref 22–29)
HCT VFR BLD CALC: 33 % — LOW (ref 39–50)
HCT VFR BLD CALC: 35.9 % — LOW (ref 39–50)
HCT VFR BLDA CALC: 34 % — SIGNIFICANT CHANGE UP
HCT VFR BLDA CALC: 34 % — SIGNIFICANT CHANGE UP
HCT VFR BLDA CALC: 35 % — SIGNIFICANT CHANGE UP
HCT VFR BLDA CALC: 36 % — SIGNIFICANT CHANGE UP
HCT VFR BLDA CALC: 37 % — SIGNIFICANT CHANGE UP
HCT VFR BLDA CALC: 37 % — SIGNIFICANT CHANGE UP
HCT VFR BLDA CALC: 41 % — SIGNIFICANT CHANGE UP
HCT VFR BLDA CALC: 42 % — SIGNIFICANT CHANGE UP
HGB BLD CALC-MCNC: 11.2 G/DL — LOW (ref 12.6–17.4)
HGB BLD CALC-MCNC: 11.7 G/DL — LOW (ref 12.6–17.4)
HGB BLD CALC-MCNC: 12.1 G/DL — LOW (ref 12.6–17.4)
HGB BLD CALC-MCNC: 12.2 G/DL — LOW (ref 12.6–17.4)
HGB BLD-MCNC: 11.6 G/DL — LOW (ref 13–17)
HGB BLD-MCNC: 12.5 G/DL — LOW (ref 13–17)
HGB BLDA-MCNC: 11.4 G/DL — LOW (ref 12.6–17.4)
HGB BLDA-MCNC: 11.9 G/DL — LOW (ref 12.6–17.4)
HGB BLDA-MCNC: 12 G/DL — LOW (ref 12.6–17.4)
HGB BLDA-MCNC: 12.1 G/DL — LOW (ref 12.6–17.4)
HGB BLDA-MCNC: 12.1 G/DL — LOW (ref 12.6–17.4)
HGB BLDA-MCNC: 12.2 G/DL — LOW (ref 12.6–17.4)
HGB BLDA-MCNC: 13.8 G/DL — SIGNIFICANT CHANGE UP (ref 12.6–17.4)
HGB BLDA-MCNC: 14.1 G/DL — SIGNIFICANT CHANGE UP (ref 12.6–17.4)
IMM GRANULOCYTES # BLD AUTO: 0.08 K/UL — HIGH (ref 0–0.07)
IMM GRANULOCYTES NFR BLD AUTO: 0.6 % — SIGNIFICANT CHANGE UP (ref 0–0.9)
INR BLD: 1.29 RATIO — HIGH (ref 0.85–1.16)
INR BLD: 1.6 RATIO — HIGH (ref 0.85–1.16)
LACTATE BLDA-MCNC: 0.8 MMOL/L — SIGNIFICANT CHANGE UP (ref 0.5–2)
LACTATE BLDA-MCNC: 1.2 MMOL/L — SIGNIFICANT CHANGE UP (ref 0.5–2)
LACTATE BLDA-MCNC: 1.2 MMOL/L — SIGNIFICANT CHANGE UP (ref 0.5–2)
LACTATE BLDA-MCNC: 1.3 MMOL/L — SIGNIFICANT CHANGE UP (ref 0.5–2)
LACTATE BLDA-MCNC: 1.4 MMOL/L — SIGNIFICANT CHANGE UP (ref 0.5–2)
LACTATE BLDA-MCNC: 1.8 MMOL/L — SIGNIFICANT CHANGE UP (ref 0.5–2)
LACTATE BLDA-MCNC: 2.8 MMOL/L — HIGH (ref 0.5–2)
LACTATE BLDA-MCNC: 3.2 MMOL/L — HIGH (ref 0.5–2)
LACTATE BLDV-MCNC: 1.1 MMOL/L — SIGNIFICANT CHANGE UP (ref 0.5–2)
LACTATE BLDV-MCNC: 1.3 MMOL/L — SIGNIFICANT CHANGE UP (ref 0.5–2)
LACTATE BLDV-MCNC: 1.4 MMOL/L — SIGNIFICANT CHANGE UP (ref 0.5–2)
LACTATE BLDV-MCNC: 1.8 MMOL/L — SIGNIFICANT CHANGE UP (ref 0.5–2)
LYMPHOCYTES # BLD AUTO: 2.12 K/UL — SIGNIFICANT CHANGE UP (ref 1–3.3)
LYMPHOCYTES NFR BLD AUTO: 14.9 % — SIGNIFICANT CHANGE UP (ref 13–44)
MAGNESIUM SERPL-MCNC: 2.2 MG/DL — SIGNIFICANT CHANGE UP (ref 1.6–2.6)
MAGNESIUM SERPL-MCNC: 2.4 MG/DL — SIGNIFICANT CHANGE UP (ref 1.6–2.6)
MCHC RBC-ENTMCNC: 32 PG — SIGNIFICANT CHANGE UP (ref 27–34)
MCHC RBC-ENTMCNC: 32.1 PG — SIGNIFICANT CHANGE UP (ref 27–34)
MCHC RBC-ENTMCNC: 34.8 G/DL — SIGNIFICANT CHANGE UP (ref 32–36)
MCHC RBC-ENTMCNC: 35.2 G/DL — SIGNIFICANT CHANGE UP (ref 32–36)
MCV RBC AUTO: 91.2 FL — SIGNIFICANT CHANGE UP (ref 80–100)
MCV RBC AUTO: 92.3 FL — SIGNIFICANT CHANGE UP (ref 80–100)
METHGB MFR BLDA: 0.5 % — SIGNIFICANT CHANGE UP
METHGB MFR BLDA: 0.5 % — SIGNIFICANT CHANGE UP
METHGB MFR BLDA: 0.7 % — SIGNIFICANT CHANGE UP
METHGB MFR BLDA: 0.7 % — SIGNIFICANT CHANGE UP
METHGB MFR BLDA: 0.8 % — SIGNIFICANT CHANGE UP
METHGB MFR BLDA: 0.9 % — SIGNIFICANT CHANGE UP
METHGB MFR BLDA: 0.9 % — SIGNIFICANT CHANGE UP
METHGB MFR BLDA: 1.1 % — SIGNIFICANT CHANGE UP
METHGB MFR BLDV: 0 % — SIGNIFICANT CHANGE UP
METHGB MFR BLDV: 0.3 % — SIGNIFICANT CHANGE UP
METHGB MFR BLDV: 0.6 % — SIGNIFICANT CHANGE UP
METHGB MFR BLDV: 0.7 % — SIGNIFICANT CHANGE UP
MONOCYTES # BLD AUTO: 0.89 K/UL — SIGNIFICANT CHANGE UP (ref 0–0.9)
MONOCYTES NFR BLD AUTO: 6.3 % — SIGNIFICANT CHANGE UP (ref 2–14)
NEUTROPHILS # BLD AUTO: 11.03 K/UL — HIGH (ref 1.8–7.4)
NEUTROPHILS NFR BLD AUTO: 77.3 % — HIGH (ref 43–77)
NRBC # BLD AUTO: 0 K/UL — SIGNIFICANT CHANGE UP (ref 0–0)
NRBC # BLD AUTO: 0 K/UL — SIGNIFICANT CHANGE UP (ref 0–0)
NRBC # FLD: 0 K/UL — SIGNIFICANT CHANGE UP (ref 0–0)
NRBC # FLD: 0 K/UL — SIGNIFICANT CHANGE UP (ref 0–0)
NRBC BLD AUTO-RTO: 0 /100 WBCS — SIGNIFICANT CHANGE UP (ref 0–0)
NRBC BLD AUTO-RTO: 0 /100 WBCS — SIGNIFICANT CHANGE UP (ref 0–0)
OXYHGB MFR BLDA: 98 % — HIGH (ref 90–95)
OXYHGB MFR BLDA: 99 % — HIGH (ref 90–95)
PCO2 BLDA: 39 MMHG — SIGNIFICANT CHANGE UP (ref 35–48)
PCO2 BLDA: 39 MMHG — SIGNIFICANT CHANGE UP (ref 35–48)
PCO2 BLDA: 40 MMHG — SIGNIFICANT CHANGE UP (ref 35–48)
PCO2 BLDA: 41 MMHG — SIGNIFICANT CHANGE UP (ref 35–48)
PCO2 BLDA: 46 MMHG — SIGNIFICANT CHANGE UP (ref 35–48)
PCO2 BLDA: 47 MMHG — SIGNIFICANT CHANGE UP (ref 35–48)
PCO2 BLDA: 48 MMHG — SIGNIFICANT CHANGE UP (ref 35–48)
PCO2 BLDA: 57 MMHG — HIGH (ref 35–48)
PCO2 BLDV: 43 MMHG — SIGNIFICANT CHANGE UP (ref 42–55)
PCO2 BLDV: 48 MMHG — SIGNIFICANT CHANGE UP (ref 42–55)
PCO2 BLDV: 56 MMHG — HIGH (ref 42–55)
PCO2 BLDV: 60 MMHG — HIGH (ref 42–55)
PH BLDA: 7.21 — LOW (ref 7.35–7.45)
PH BLDA: 7.29 — LOW (ref 7.35–7.45)
PH BLDA: 7.33 — LOW (ref 7.35–7.45)
PH BLDA: 7.34 — LOW (ref 7.35–7.45)
PH BLDA: 7.35 — SIGNIFICANT CHANGE UP (ref 7.35–7.45)
PH BLDA: 7.41 — SIGNIFICANT CHANGE UP (ref 7.35–7.45)
PH BLDV: 7.21 — LOW (ref 7.32–7.43)
PH BLDV: 7.27 — LOW (ref 7.32–7.43)
PH BLDV: 7.28 — LOW (ref 7.32–7.43)
PH BLDV: 7.3 — LOW (ref 7.32–7.43)
PHOSPHATE SERPL-MCNC: 2.7 MG/DL — SIGNIFICANT CHANGE UP (ref 2.4–4.7)
PLATELET # BLD AUTO: 156 K/UL — SIGNIFICANT CHANGE UP (ref 150–400)
PLATELET # BLD AUTO: 198 K/UL — SIGNIFICANT CHANGE UP (ref 150–400)
PMV BLD: 10 FL — SIGNIFICANT CHANGE UP (ref 7–13)
PMV BLD: 9.9 FL — SIGNIFICANT CHANGE UP (ref 7–13)
PO2 BLDA: 350 MMHG — HIGH (ref 83–108)
PO2 BLDA: 386 MMHG — HIGH (ref 83–108)
PO2 BLDA: 460 MMHG — HIGH (ref 83–108)
PO2 BLDA: 467 MMHG — HIGH (ref 83–108)
PO2 BLDA: 485 MMHG — HIGH (ref 83–108)
PO2 BLDA: >496 MMHG — HIGH (ref 83–108)
PO2 BLDV: 54 MMHG — HIGH (ref 25–45)
PO2 BLDV: 67 MMHG — HIGH (ref 25–45)
PO2 BLDV: 68 MMHG — HIGH (ref 25–45)
PO2 BLDV: 84 MMHG — HIGH (ref 25–45)
POTASSIUM BLDA-SCNC: 3.3 MMOL/L — LOW (ref 3.5–5.1)
POTASSIUM BLDA-SCNC: 3.9 MMOL/L — SIGNIFICANT CHANGE UP (ref 3.5–5.1)
POTASSIUM BLDA-SCNC: 4.1 MMOL/L — SIGNIFICANT CHANGE UP (ref 3.5–5.1)
POTASSIUM BLDA-SCNC: 4.2 MMOL/L — SIGNIFICANT CHANGE UP (ref 3.5–5.1)
POTASSIUM BLDA-SCNC: 4.5 MMOL/L — SIGNIFICANT CHANGE UP (ref 3.5–5.1)
POTASSIUM BLDA-SCNC: 4.5 MMOL/L — SIGNIFICANT CHANGE UP (ref 3.5–5.1)
POTASSIUM BLDA-SCNC: 4.6 MMOL/L — SIGNIFICANT CHANGE UP (ref 3.5–5.1)
POTASSIUM BLDA-SCNC: 5.1 MMOL/L — SIGNIFICANT CHANGE UP (ref 3.5–5.1)
POTASSIUM BLDV-SCNC: 4.1 MMOL/L — SIGNIFICANT CHANGE UP (ref 3.5–5.1)
POTASSIUM BLDV-SCNC: 4.4 MMOL/L — SIGNIFICANT CHANGE UP (ref 3.5–5.1)
POTASSIUM BLDV-SCNC: 4.6 MMOL/L — SIGNIFICANT CHANGE UP (ref 3.5–5.1)
POTASSIUM BLDV-SCNC: 4.9 MMOL/L — SIGNIFICANT CHANGE UP (ref 3.5–5.1)
POTASSIUM SERPL-MCNC: 3.6 MMOL/L — SIGNIFICANT CHANGE UP (ref 3.5–5.3)
POTASSIUM SERPL-MCNC: 4.6 MMOL/L — SIGNIFICANT CHANGE UP (ref 3.5–5.3)
POTASSIUM SERPL-SCNC: 3.6 MMOL/L — SIGNIFICANT CHANGE UP (ref 3.5–5.3)
POTASSIUM SERPL-SCNC: 4.6 MMOL/L — SIGNIFICANT CHANGE UP (ref 3.5–5.3)
PROT SERPL-MCNC: 5.5 G/DL — LOW (ref 6.6–8.7)
PROTHROM AB SERPL-ACNC: 14.5 SEC — HIGH (ref 9.9–13.4)
PROTHROM AB SERPL-ACNC: 18 SEC — HIGH (ref 9.9–13.4)
RBC # BLD: 3.62 M/UL — LOW (ref 4.2–5.8)
RBC # BLD: 3.89 M/UL — LOW (ref 4.2–5.8)
RBC # FLD: 12.8 % — SIGNIFICANT CHANGE UP (ref 10.3–14.5)
RBC # FLD: 12.9 % — SIGNIFICANT CHANGE UP (ref 10.3–14.5)
SAO2 % BLDA: 100 % — HIGH (ref 94–98)
SAO2 % BLDV: 86.8 % — SIGNIFICANT CHANGE UP (ref 67–88)
SAO2 % BLDV: 93.8 % — HIGH (ref 67–88)
SAO2 % BLDV: 97 % — HIGH (ref 67–88)
SAO2 % BLDV: 98.3 % — HIGH (ref 67–88)
SODIUM BLDA-SCNC: 132 MMOL/L — LOW (ref 136–145)
SODIUM BLDA-SCNC: 133 MMOL/L — LOW (ref 136–145)
SODIUM BLDA-SCNC: 135 MMOL/L — LOW (ref 136–145)
SODIUM BLDA-SCNC: 138 MMOL/L — SIGNIFICANT CHANGE UP (ref 136–145)
SODIUM BLDA-SCNC: 138 MMOL/L — SIGNIFICANT CHANGE UP (ref 136–145)
SODIUM BLDA-SCNC: 139 MMOL/L — SIGNIFICANT CHANGE UP (ref 136–145)
SODIUM SERPL-SCNC: 140 MMOL/L — SIGNIFICANT CHANGE UP (ref 135–145)
SODIUM SERPL-SCNC: 143 MMOL/L — SIGNIFICANT CHANGE UP (ref 135–145)
TROPONIN T, HIGH SENSITIVITY RESULT: 921 NG/L — HIGH (ref 0–51)
WBC # BLD: 14.24 K/UL — HIGH (ref 3.8–10.5)
WBC # BLD: 7.63 K/UL — SIGNIFICANT CHANGE UP (ref 3.8–10.5)
WBC # FLD AUTO: 14.24 K/UL — HIGH (ref 3.8–10.5)
WBC # FLD AUTO: 7.63 K/UL — SIGNIFICANT CHANGE UP (ref 3.8–10.5)

## 2025-07-22 PROCEDURE — 99292 CRITICAL CARE ADDL 30 MIN: CPT

## 2025-07-22 PROCEDURE — 85027 COMPLETE CBC AUTOMATED: CPT

## 2025-07-22 PROCEDURE — 80053 COMPREHEN METABOLIC PANEL: CPT

## 2025-07-22 PROCEDURE — 85018 HEMOGLOBIN: CPT

## 2025-07-22 PROCEDURE — 85025 COMPLETE CBC W/AUTO DIFF WBC: CPT

## 2025-07-22 PROCEDURE — 88305 TISSUE EXAM BY PATHOLOGIST: CPT | Mod: 26

## 2025-07-22 PROCEDURE — 33257 ABLATE ATRIA LMTD ADD-ON: CPT | Mod: AS

## 2025-07-22 PROCEDURE — 76998 US GUIDE INTRAOP: CPT | Mod: 26,NC,AS,59

## 2025-07-22 PROCEDURE — 82550 ASSAY OF CK (CPK): CPT

## 2025-07-22 PROCEDURE — 36415 COLL VENOUS BLD VENIPUNCTURE: CPT

## 2025-07-22 PROCEDURE — 85384 FIBRINOGEN ACTIVITY: CPT

## 2025-07-22 PROCEDURE — 93312 ECHO TRANSESOPHAGEAL: CPT | Mod: 26

## 2025-07-22 PROCEDURE — 94002 VENT MGMT INPAT INIT DAY: CPT

## 2025-07-22 PROCEDURE — 85610 PROTHROMBIN TIME: CPT

## 2025-07-22 PROCEDURE — 93321 DOPPLER ECHO F-UP/LMTD STD: CPT | Mod: 26

## 2025-07-22 PROCEDURE — 76376 3D RENDER W/INTRP POSTPROCES: CPT | Mod: 26

## 2025-07-22 PROCEDURE — 71045 X-RAY EXAM CHEST 1 VIEW: CPT | Mod: 26

## 2025-07-22 PROCEDURE — 94760 N-INVAS EAR/PLS OXIMETRY 1: CPT

## 2025-07-22 PROCEDURE — 31720 CLEARANCE OF AIRWAYS: CPT

## 2025-07-22 PROCEDURE — 82330 ASSAY OF CALCIUM: CPT

## 2025-07-22 PROCEDURE — 85014 HEMATOCRIT: CPT

## 2025-07-22 PROCEDURE — 33427 REPAIR OF MITRAL VALVE: CPT | Mod: AS

## 2025-07-22 PROCEDURE — 85730 THROMBOPLASTIN TIME PARTIAL: CPT

## 2025-07-22 PROCEDURE — 93005 ELECTROCARDIOGRAM TRACING: CPT

## 2025-07-22 PROCEDURE — 80048 BASIC METABOLIC PNL TOTAL CA: CPT

## 2025-07-22 PROCEDURE — 82803 BLOOD GASES ANY COMBINATION: CPT

## 2025-07-22 PROCEDURE — 82962 GLUCOSE BLOOD TEST: CPT

## 2025-07-22 PROCEDURE — 33427 REPAIR OF MITRAL VALVE: CPT

## 2025-07-22 PROCEDURE — 33533 CABG ARTERIAL SINGLE: CPT | Mod: AS

## 2025-07-22 PROCEDURE — 33533 CABG ARTERIAL SINGLE: CPT

## 2025-07-22 PROCEDURE — 84132 ASSAY OF SERUM POTASSIUM: CPT

## 2025-07-22 PROCEDURE — P9045: CPT

## 2025-07-22 PROCEDURE — 33257 ABLATE ATRIA LMTD ADD-ON: CPT

## 2025-07-22 PROCEDURE — 84100 ASSAY OF PHOSPHORUS: CPT

## 2025-07-22 PROCEDURE — 84484 ASSAY OF TROPONIN QUANT: CPT

## 2025-07-22 PROCEDURE — 84295 ASSAY OF SERUM SODIUM: CPT

## 2025-07-22 PROCEDURE — 82435 ASSAY OF BLOOD CHLORIDE: CPT

## 2025-07-22 PROCEDURE — 71045 X-RAY EXAM CHEST 1 VIEW: CPT

## 2025-07-22 PROCEDURE — 83605 ASSAY OF LACTIC ACID: CPT

## 2025-07-22 PROCEDURE — 99291 CRITICAL CARE FIRST HOUR: CPT

## 2025-07-22 PROCEDURE — 82553 CREATINE MB FRACTION: CPT

## 2025-07-22 PROCEDURE — 83735 ASSAY OF MAGNESIUM: CPT

## 2025-07-22 PROCEDURE — 93010 ELECTROCARDIOGRAM REPORT: CPT

## 2025-07-22 PROCEDURE — 76998 US GUIDE INTRAOP: CPT | Mod: 26,59

## 2025-07-22 PROCEDURE — 82947 ASSAY GLUCOSE BLOOD QUANT: CPT

## 2025-07-22 PROCEDURE — 93325 DOPPLER ECHO COLOR FLOW MAPG: CPT | Mod: 26

## 2025-07-22 DEVICE — COR-KNOT MINI DEVICE COMBO KIT: Type: IMPLANTABLE DEVICE | Status: FUNCTIONAL

## 2025-07-22 DEVICE — CANNULA VENOUS 1 STAGE RIGHT ANGLE 28FR X 3/8": Type: IMPLANTABLE DEVICE | Status: FUNCTIONAL

## 2025-07-22 DEVICE — CANNULA VENOUS 1 STAGE RIGHT ANGLE 30FR X 3/8": Type: IMPLANTABLE DEVICE | Status: FUNCTIONAL

## 2025-07-22 DEVICE — FLOSEAL WITH RECOTHROM THROMBIN 10ML: Type: IMPLANTABLE DEVICE | Status: FUNCTIONAL

## 2025-07-22 DEVICE — CANNULA VESSEL 3MM BLUNT TIP CLEAR 1-WAY VALVE: Type: IMPLANTABLE DEVICE | Status: FUNCTIONAL

## 2025-07-22 DEVICE — ANNULOPLASTY RING MITRAL TRICUSPID 32MM: Type: IMPLANTABLE DEVICE | Status: FUNCTIONAL

## 2025-07-22 DEVICE — ATRICLIP LAA EXCLUSION DEVICE 50MM FLEX-V: Type: IMPLANTABLE DEVICE | Status: FUNCTIONAL

## 2025-07-22 DEVICE — CANNULA ATRASUMP 1/4" X 38CM: Type: IMPLANTABLE DEVICE | Status: FUNCTIONAL

## 2025-07-22 DEVICE — CATH INFUS SL 7FR 20CM: Type: IMPLANTABLE DEVICE | Status: FUNCTIONAL

## 2025-07-22 DEVICE — PACING WIRE WHITE M-25 WINGED WIRE 37MM X 89MM: Type: IMPLANTABLE DEVICE | Status: FUNCTIONAL

## 2025-07-22 DEVICE — CATH VENT VENTRICULAR PVC 18FR X 4.25" TIP PERFORATION: Type: IMPLANTABLE DEVICE | Status: FUNCTIONAL

## 2025-07-22 DEVICE — CANNULA ARTERIAL STRAIGHT 20FR X 3/8" VENTED: Type: IMPLANTABLE DEVICE | Status: FUNCTIONAL

## 2025-07-22 DEVICE — CHEST DRAIN PLEUR-EVAC 32FR STRAIGHT: Type: IMPLANTABLE DEVICE | Status: FUNCTIONAL

## 2025-07-22 DEVICE — CANNULA AORTIC ROOT 14G X 14CM FLANGED: Type: IMPLANTABLE DEVICE | Status: FUNCTIONAL

## 2025-07-22 DEVICE — KIT CVC 2LUM MAC 9FR CHG: Type: IMPLANTABLE DEVICE | Status: FUNCTIONAL

## 2025-07-22 DEVICE — KIT A-LINE 1LUM 20G X 12CM SAFE KIT: Type: IMPLANTABLE DEVICE | Status: FUNCTIONAL

## 2025-07-22 DEVICE — BONE WAX 2.5GM: Type: IMPLANTABLE DEVICE | Status: FUNCTIONAL

## 2025-07-22 DEVICE — CHEST DRAIN THORACIC ARGYLE PVC 28FR RIGHT ANGLE: Type: IMPLANTABLE DEVICE | Status: FUNCTIONAL

## 2025-07-22 DEVICE — OCCLUDER INTERNAL VESSEL FLO-RESTER 1 X 12MM: Type: IMPLANTABLE DEVICE | Status: FUNCTIONAL

## 2025-07-22 DEVICE — CANNULA ARTERIAL SOFT-FLOW 24FR EXTENDED VENTED: Type: IMPLANTABLE DEVICE | Status: FUNCTIONAL

## 2025-07-22 DEVICE — CANNULA RETROGRADE CARDIOPLEGIA SELF-INFLATING 14FR PRE-SHAPED STYLET/HANDLE: Type: IMPLANTABLE DEVICE | Status: FUNCTIONAL

## 2025-07-22 DEVICE — PACING WIRE ORANGE M-25 WINGED WIRE 37MM X 89MM: Type: IMPLANTABLE DEVICE | Status: FUNCTIONAL

## 2025-07-22 RX ORDER — VASOPRESSIN 20 [USP'U]/ML
0.06 INJECTION INTRAVENOUS
Qty: 40 | Refills: 0 | Status: DISCONTINUED | OUTPATIENT
Start: 2025-07-22 | End: 2025-07-23

## 2025-07-22 RX ORDER — POLYETHYLENE GLYCOL 3350 17 G/17G
17 POWDER, FOR SOLUTION ORAL DAILY
Refills: 0 | Status: DISCONTINUED | OUTPATIENT
Start: 2025-07-23 | End: 2025-07-27

## 2025-07-22 RX ORDER — ACETAMINOPHEN 500 MG/5ML
1000 LIQUID (ML) ORAL EVERY 6 HOURS
Refills: 0 | Status: COMPLETED | OUTPATIENT
Start: 2025-07-22 | End: 2025-07-23

## 2025-07-22 RX ORDER — VANCOMYCIN HCL IN 5 % DEXTROSE 1.5G/250ML
1250 PLASTIC BAG, INJECTION (ML) INTRAVENOUS EVERY 12 HOURS
Refills: 0 | Status: COMPLETED | OUTPATIENT
Start: 2025-07-22 | End: 2025-07-24

## 2025-07-22 RX ORDER — NOREPINEPHRINE BITARTRATE 8 MG
0.02 SOLUTION INTRAVENOUS
Qty: 8 | Refills: 0 | Status: DISCONTINUED | OUTPATIENT
Start: 2025-07-22 | End: 2025-07-23

## 2025-07-22 RX ORDER — CEFUROXIME SODIUM 1.5 G
1500 VIAL (EA) INJECTION EVERY 8 HOURS
Refills: 0 | Status: COMPLETED | OUTPATIENT
Start: 2025-07-22 | End: 2025-07-24

## 2025-07-22 RX ORDER — ALBUMIN (HUMAN) 12.5 G/50ML
250 INJECTION, SOLUTION INTRAVENOUS ONCE
Refills: 0 | Status: COMPLETED | OUTPATIENT
Start: 2025-07-22 | End: 2025-07-22

## 2025-07-22 RX ORDER — ASPIRIN 325 MG
81 TABLET ORAL ONCE
Refills: 0 | Status: COMPLETED | OUTPATIENT
Start: 2025-07-22 | End: 2025-07-22

## 2025-07-22 RX ORDER — POLYETHYLENE GLYCOL 3350 17 G/17G
17 POWDER, FOR SOLUTION ORAL DAILY
Refills: 0 | Status: DISCONTINUED | OUTPATIENT
Start: 2025-07-22 | End: 2025-07-23

## 2025-07-22 RX ORDER — PROPOFOL 10 MG/ML
25 INJECTION, EMULSION INTRAVENOUS
Qty: 1000 | Refills: 0 | Status: DISCONTINUED | OUTPATIENT
Start: 2025-07-22 | End: 2025-07-22

## 2025-07-22 RX ORDER — AMIODARONE HYDROCHLORIDE 50 MG/ML
400 INJECTION, SOLUTION INTRAVENOUS
Refills: 0 | Status: DISCONTINUED | OUTPATIENT
Start: 2025-07-22 | End: 2025-07-23

## 2025-07-22 RX ORDER — HYDROMORPHONE/SOD CHLOR,ISO/PF 2 MG/10 ML
0.5 SYRINGE (ML) INJECTION EVERY 6 HOURS
Refills: 0 | Status: DISCONTINUED | OUTPATIENT
Start: 2025-07-22 | End: 2025-07-22

## 2025-07-22 RX ORDER — ATORVASTATIN CALCIUM 80 MG/1
80 TABLET, FILM COATED ORAL AT BEDTIME
Refills: 0 | Status: DISCONTINUED | OUTPATIENT
Start: 2025-07-23 | End: 2025-07-27

## 2025-07-22 RX ORDER — DEXAMETHASONE 0.5 MG/1
4 TABLET ORAL EVERY 8 HOURS
Refills: 0 | Status: COMPLETED | OUTPATIENT
Start: 2025-07-22 | End: 2025-07-23

## 2025-07-22 RX ORDER — CALCIUM GLUCONATE 20 MG/ML
2 INJECTION, SOLUTION INTRAVENOUS ONCE
Refills: 0 | Status: COMPLETED | OUTPATIENT
Start: 2025-07-22 | End: 2025-07-22

## 2025-07-22 RX ORDER — GABAPENTIN 400 MG/1
100 CAPSULE ORAL EVERY 8 HOURS
Refills: 0 | Status: DISCONTINUED | OUTPATIENT
Start: 2025-07-23 | End: 2025-07-27

## 2025-07-22 RX ORDER — SODIUM CHLORIDE 9 G/1000ML
250 INJECTION, SOLUTION INTRAVENOUS ONCE
Refills: 0 | Status: COMPLETED | OUTPATIENT
Start: 2025-07-22 | End: 2025-07-22

## 2025-07-22 RX ORDER — SENNA 187 MG
2 TABLET ORAL AT BEDTIME
Refills: 0 | Status: DISCONTINUED | OUTPATIENT
Start: 2025-07-23 | End: 2025-07-27

## 2025-07-22 RX ORDER — ACETAMINOPHEN 500 MG/5ML
975 LIQUID (ML) ORAL EVERY 6 HOURS
Refills: 0 | Status: COMPLETED | OUTPATIENT
Start: 2025-07-23 | End: 2025-07-26

## 2025-07-22 RX ORDER — FENTANYL CITRATE-0.9 % NACL/PF 100MCG/2ML
50 SYRINGE (ML) INTRAVENOUS ONCE
Refills: 0 | Status: DISCONTINUED | OUTPATIENT
Start: 2025-07-22 | End: 2025-07-22

## 2025-07-22 RX ORDER — LIDOCAINE HCL/PF 10 MG/ML
100 VIAL (ML) INJECTION ONCE
Refills: 0 | Status: COMPLETED | OUTPATIENT
Start: 2025-07-22 | End: 2025-07-22

## 2025-07-22 RX ORDER — ACETAMINOPHEN 500 MG/5ML
650 LIQUID (ML) ORAL EVERY 6 HOURS
Refills: 0 | Status: COMPLETED | OUTPATIENT
Start: 2025-07-25 | End: 2026-06-23

## 2025-07-22 RX ORDER — DOBUTAMINE 250 MG/20ML
2.5 INJECTION INTRAVENOUS
Qty: 500 | Refills: 0 | Status: DISCONTINUED | OUTPATIENT
Start: 2025-07-22 | End: 2025-07-23

## 2025-07-22 RX ORDER — MAGNESIUM SULFATE 500 MG/ML
2 SYRINGE (ML) INJECTION ONCE
Refills: 0 | Status: COMPLETED | OUTPATIENT
Start: 2025-07-22 | End: 2025-07-22

## 2025-07-22 RX ORDER — ASPIRIN 325 MG
81 TABLET ORAL DAILY
Refills: 0 | Status: DISCONTINUED | OUTPATIENT
Start: 2025-07-23 | End: 2025-07-27

## 2025-07-22 RX ORDER — MILRINONE LACTATE 1 MG/ML
0.12 INJECTION, SOLUTION INTRAVENOUS
Qty: 20 | Refills: 0 | Status: DISCONTINUED | OUTPATIENT
Start: 2025-07-22 | End: 2025-07-25

## 2025-07-22 RX ORDER — DOBUTAMINE 250 MG/20ML
5 INJECTION INTRAVENOUS
Qty: 500 | Refills: 0 | Status: DISCONTINUED | OUTPATIENT
Start: 2025-07-22 | End: 2025-07-22

## 2025-07-22 RX ORDER — OXYCODONE HYDROCHLORIDE 30 MG/1
10 TABLET ORAL EVERY 4 HOURS
Refills: 0 | Status: DISCONTINUED | OUTPATIENT
Start: 2025-07-23 | End: 2025-07-27

## 2025-07-22 RX ORDER — OXYCODONE HYDROCHLORIDE 30 MG/1
5 TABLET ORAL EVERY 4 HOURS
Refills: 0 | Status: DISCONTINUED | OUTPATIENT
Start: 2025-07-23 | End: 2025-07-27

## 2025-07-22 RX ORDER — CEFUROXIME SODIUM 1.5 G
1500 VIAL (EA) INJECTION ONCE
Refills: 0 | Status: DISCONTINUED | OUTPATIENT
Start: 2025-07-22 | End: 2025-07-22

## 2025-07-22 RX ORDER — DEXTROSE 50 % IN WATER 50 %
50 SYRINGE (ML) INTRAVENOUS
Refills: 0 | Status: DISCONTINUED | OUTPATIENT
Start: 2025-07-22 | End: 2025-07-25

## 2025-07-22 RX ORDER — DEXTROSE 50 % IN WATER 50 %
25 SYRINGE (ML) INTRAVENOUS
Refills: 0 | Status: DISCONTINUED | OUTPATIENT
Start: 2025-07-22 | End: 2025-07-25

## 2025-07-22 RX ORDER — ACETAMINOPHEN 500 MG/5ML
650 LIQUID (ML) ORAL EVERY 6 HOURS
Refills: 0 | Status: DISCONTINUED | OUTPATIENT
Start: 2025-07-25 | End: 2025-07-27

## 2025-07-22 RX ORDER — IRON SUCROSE 20 MG/ML
100 INJECTION, SOLUTION INTRAVENOUS EVERY 24 HOURS
Refills: 0 | Status: COMPLETED | OUTPATIENT
Start: 2025-07-22 | End: 2025-07-25

## 2025-07-22 RX ORDER — BISACODYL 5 MG
10 TABLET, DELAYED RELEASE (ENTERIC COATED) ORAL ONCE
Refills: 0 | Status: DISCONTINUED | OUTPATIENT
Start: 2025-07-24 | End: 2025-07-27

## 2025-07-22 RX ADMIN — MILRINONE LACTATE 6.83 MICROGRAM(S)/KG/MIN: 1 INJECTION, SOLUTION INTRAVENOUS at 16:59

## 2025-07-22 RX ADMIN — MILRINONE LACTATE 6.83 MICROGRAM(S)/KG/MIN: 1 INJECTION, SOLUTION INTRAVENOUS at 22:55

## 2025-07-22 RX ADMIN — Medication 40 MILLIGRAM(S): at 15:30

## 2025-07-22 RX ADMIN — Medication 2 UNIT(S)/HR: at 20:51

## 2025-07-22 RX ADMIN — ALBUMIN (HUMAN) 125 MILLILITER(S): 12.5 INJECTION, SOLUTION INTRAVENOUS at 20:50

## 2025-07-22 RX ADMIN — Medication 50 MICROGRAM(S): at 17:30

## 2025-07-22 RX ADMIN — Medication 20 MILLIGRAM(S): at 16:41

## 2025-07-22 RX ADMIN — Medication 100 MILLIEQUIVALENT(S): at 15:58

## 2025-07-22 RX ADMIN — VASOPRESSIN 9 UNIT(S)/MIN: 20 INJECTION INTRAVENOUS at 20:51

## 2025-07-22 RX ADMIN — Medication 25 GRAM(S): at 15:29

## 2025-07-22 RX ADMIN — Medication 2 UNIT(S)/HR: at 15:58

## 2025-07-22 RX ADMIN — SODIUM CHLORIDE 500 MILLILITER(S): 9 INJECTION, SOLUTION INTRAVENOUS at 15:29

## 2025-07-22 RX ADMIN — Medication 100 MILLIGRAM(S): at 16:59

## 2025-07-22 RX ADMIN — Medication 50 MICROGRAM(S): at 16:59

## 2025-07-22 RX ADMIN — Medication 100 MILLIEQUIVALENT(S): at 15:30

## 2025-07-22 RX ADMIN — Medication 15 MILLILITER(S): at 17:46

## 2025-07-22 RX ADMIN — Medication 25 GRAM(S): at 16:42

## 2025-07-22 RX ADMIN — AMIODARONE HYDROCHLORIDE 400 MILLIGRAM(S): 50 INJECTION, SOLUTION INTRAVENOUS at 17:46

## 2025-07-22 RX ADMIN — PROPOFOL 13.7 MICROGRAM(S)/KG/MIN: 10 INJECTION, EMULSION INTRAVENOUS at 15:59

## 2025-07-22 RX ADMIN — CALCIUM GLUCONATE 200 GRAM(S): 20 INJECTION, SOLUTION INTRAVENOUS at 15:30

## 2025-07-22 RX ADMIN — Medication 81 MILLIGRAM(S): at 17:49

## 2025-07-22 RX ADMIN — Medication 166.67 MILLIGRAM(S): at 21:10

## 2025-07-22 RX ADMIN — ALBUMIN (HUMAN) 125 MILLILITER(S): 12.5 INJECTION, SOLUTION INTRAVENOUS at 23:18

## 2025-07-22 RX ADMIN — Medication 0.5 MILLIGRAM(S): at 21:18

## 2025-07-22 RX ADMIN — PROPOFOL 13.7 MICROGRAM(S)/KG/MIN: 10 INJECTION, EMULSION INTRAVENOUS at 21:12

## 2025-07-22 RX ADMIN — PROPOFOL 13.7 MICROGRAM(S)/KG/MIN: 10 INJECTION, EMULSION INTRAVENOUS at 20:51

## 2025-07-22 RX ADMIN — DEXAMETHASONE 4 MILLIGRAM(S): 0.5 TABLET ORAL at 22:28

## 2025-07-22 RX ADMIN — DOBUTAMINE 13.7 MICROGRAM(S)/KG/MIN: 250 INJECTION INTRAVENOUS at 20:51

## 2025-07-22 RX ADMIN — Medication 500 MILLIGRAM(S): at 17:45

## 2025-07-22 RX ADMIN — Medication 1 APPLICATION(S): at 15:29

## 2025-07-22 RX ADMIN — Medication 0.5 MILLIGRAM(S): at 21:45

## 2025-07-22 RX ADMIN — MILRINONE LACTATE 6.83 MICROGRAM(S)/KG/MIN: 1 INJECTION, SOLUTION INTRAVENOUS at 20:51

## 2025-07-22 RX ADMIN — Medication 5 MILLILITER(S): at 20:52

## 2025-07-22 RX ADMIN — Medication 3 MILLILITER(S): at 06:45

## 2025-07-22 RX ADMIN — Medication 400 MILLIGRAM(S): at 23:48

## 2025-07-22 RX ADMIN — Medication 10 MILLILITER(S): at 20:52

## 2025-07-22 RX ADMIN — Medication 50 MICROGRAM(S): at 15:50

## 2025-07-22 RX ADMIN — Medication 100 MILLIEQUIVALENT(S): at 16:00

## 2025-07-22 RX ADMIN — Medication 50 MICROGRAM(S): at 15:30

## 2025-07-22 RX ADMIN — Medication 100 MILLIGRAM(S): at 17:02

## 2025-07-22 RX ADMIN — Medication 6.83 MICROGRAM(S)/KG/MIN: at 16:59

## 2025-07-23 LAB
ALBUMIN SERPL ELPH-MCNC: 3.7 G/DL — SIGNIFICANT CHANGE UP (ref 3.3–5.2)
ALP SERPL-CCNC: 39 U/L — LOW (ref 40–120)
ALT FLD-CCNC: 39 U/L — SIGNIFICANT CHANGE UP
ANION GAP SERPL CALC-SCNC: 14 MMOL/L — SIGNIFICANT CHANGE UP (ref 5–17)
APTT BLD: 26.8 SEC — SIGNIFICANT CHANGE UP (ref 26.1–36.8)
AST SERPL-CCNC: 74 U/L — HIGH
BILIRUB SERPL-MCNC: 1 MG/DL — SIGNIFICANT CHANGE UP (ref 0.4–2)
BUN SERPL-MCNC: 21.6 MG/DL — HIGH (ref 8–20)
CALCIUM SERPL-MCNC: 8.5 MG/DL — SIGNIFICANT CHANGE UP (ref 8.4–10.5)
CHLORIDE SERPL-SCNC: 105 MMOL/L — SIGNIFICANT CHANGE UP (ref 96–108)
CK MB CFR SERPL CALC: 30 NG/ML — HIGH (ref 0–6.7)
CK SERPL-CCNC: 492 U/L — HIGH (ref 30–200)
CO2 SERPL-SCNC: 20 MMOL/L — LOW (ref 22–29)
CREAT SERPL-MCNC: 0.75 MG/DL — SIGNIFICANT CHANGE UP (ref 0.5–1.3)
EGFR: 100 ML/MIN/1.73M2 — SIGNIFICANT CHANGE UP
EGFR: 100 ML/MIN/1.73M2 — SIGNIFICANT CHANGE UP
GAS PNL BLDA: SIGNIFICANT CHANGE UP
GLUCOSE BLDC GLUCOMTR-MCNC: 109 MG/DL — HIGH (ref 70–99)
GLUCOSE BLDC GLUCOMTR-MCNC: 115 MG/DL — HIGH (ref 70–99)
GLUCOSE BLDC GLUCOMTR-MCNC: 121 MG/DL — HIGH (ref 70–99)
GLUCOSE BLDC GLUCOMTR-MCNC: 123 MG/DL — HIGH (ref 70–99)
GLUCOSE BLDC GLUCOMTR-MCNC: 130 MG/DL — HIGH (ref 70–99)
GLUCOSE BLDC GLUCOMTR-MCNC: 131 MG/DL — HIGH (ref 70–99)
GLUCOSE BLDC GLUCOMTR-MCNC: 134 MG/DL — HIGH (ref 70–99)
GLUCOSE BLDC GLUCOMTR-MCNC: 134 MG/DL — HIGH (ref 70–99)
GLUCOSE BLDC GLUCOMTR-MCNC: 137 MG/DL — HIGH (ref 70–99)
GLUCOSE BLDC GLUCOMTR-MCNC: 139 MG/DL — HIGH (ref 70–99)
GLUCOSE BLDC GLUCOMTR-MCNC: 140 MG/DL — HIGH (ref 70–99)
GLUCOSE BLDC GLUCOMTR-MCNC: 142 MG/DL — HIGH (ref 70–99)
GLUCOSE BLDC GLUCOMTR-MCNC: 143 MG/DL — HIGH (ref 70–99)
GLUCOSE BLDC GLUCOMTR-MCNC: 160 MG/DL — HIGH (ref 70–99)
GLUCOSE BLDC GLUCOMTR-MCNC: 161 MG/DL — HIGH (ref 70–99)
GLUCOSE BLDC GLUCOMTR-MCNC: 178 MG/DL — HIGH (ref 70–99)
GLUCOSE BLDC GLUCOMTR-MCNC: 185 MG/DL — HIGH (ref 70–99)
GLUCOSE BLDC GLUCOMTR-MCNC: 202 MG/DL — HIGH (ref 70–99)
GLUCOSE BLDC GLUCOMTR-MCNC: 99 MG/DL — SIGNIFICANT CHANGE UP (ref 70–99)
GLUCOSE SERPL-MCNC: 143 MG/DL — HIGH (ref 70–99)
HCT VFR BLD CALC: 31.4 % — LOW (ref 39–50)
HGB BLD-MCNC: 10.8 G/DL — LOW (ref 13–17)
INR BLD: 1.38 RATIO — HIGH (ref 0.85–1.16)
MAGNESIUM SERPL-MCNC: 2.2 MG/DL — SIGNIFICANT CHANGE UP (ref 1.6–2.6)
MCHC RBC-ENTMCNC: 31.9 PG — SIGNIFICANT CHANGE UP (ref 27–34)
MCHC RBC-ENTMCNC: 34.4 G/DL — SIGNIFICANT CHANGE UP (ref 32–36)
MCV RBC AUTO: 92.6 FL — SIGNIFICANT CHANGE UP (ref 80–100)
NRBC # BLD AUTO: 0 K/UL — SIGNIFICANT CHANGE UP (ref 0–0)
NRBC # FLD: 0 K/UL — SIGNIFICANT CHANGE UP (ref 0–0)
NRBC BLD AUTO-RTO: 0 /100 WBCS — SIGNIFICANT CHANGE UP (ref 0–0)
PLATELET # BLD AUTO: 132 K/UL — LOW (ref 150–400)
PMV BLD: 9.9 FL — SIGNIFICANT CHANGE UP (ref 7–13)
POTASSIUM SERPL-MCNC: 5.1 MMOL/L — SIGNIFICANT CHANGE UP (ref 3.5–5.3)
POTASSIUM SERPL-SCNC: 5.1 MMOL/L — SIGNIFICANT CHANGE UP (ref 3.5–5.3)
PROT SERPL-MCNC: 5.3 G/DL — LOW (ref 6.6–8.7)
PROTHROM AB SERPL-ACNC: 16 SEC — HIGH (ref 9.9–13.4)
RBC # BLD: 3.39 M/UL — LOW (ref 4.2–5.8)
RBC # FLD: 13 % — SIGNIFICANT CHANGE UP (ref 10.3–14.5)
SODIUM SERPL-SCNC: 139 MMOL/L — SIGNIFICANT CHANGE UP (ref 135–145)
TROPONIN T, HIGH SENSITIVITY RESULT: 606 NG/L — HIGH (ref 0–51)
WBC # BLD: 7.83 K/UL — SIGNIFICANT CHANGE UP (ref 3.8–10.5)
WBC # FLD AUTO: 7.83 K/UL — SIGNIFICANT CHANGE UP (ref 3.8–10.5)

## 2025-07-23 PROCEDURE — 99292 CRITICAL CARE ADDL 30 MIN: CPT

## 2025-07-23 PROCEDURE — 93010 ELECTROCARDIOGRAM REPORT: CPT

## 2025-07-23 PROCEDURE — 99291 CRITICAL CARE FIRST HOUR: CPT

## 2025-07-23 PROCEDURE — 71045 X-RAY EXAM CHEST 1 VIEW: CPT | Mod: 26

## 2025-07-23 RX ORDER — INSULIN LISPRO 100 U/ML
INJECTION, SOLUTION INTRAVENOUS; SUBCUTANEOUS AT BEDTIME
Refills: 0 | Status: DISCONTINUED | OUTPATIENT
Start: 2025-07-23 | End: 2025-07-25

## 2025-07-23 RX ORDER — HYDROMORPHONE/SOD CHLOR,ISO/PF 2 MG/10 ML
0.5 SYRINGE (ML) INJECTION ONCE
Refills: 0 | Status: DISCONTINUED | OUTPATIENT
Start: 2025-07-23 | End: 2025-07-23

## 2025-07-23 RX ORDER — INSULIN GLARGINE-YFGN 100 [IU]/ML
8 INJECTION, SOLUTION SUBCUTANEOUS ONCE
Refills: 0 | Status: COMPLETED | OUTPATIENT
Start: 2025-07-23 | End: 2025-07-23

## 2025-07-23 RX ORDER — AMIODARONE HYDROCHLORIDE 50 MG/ML
150 INJECTION, SOLUTION INTRAVENOUS ONCE
Refills: 0 | Status: COMPLETED | OUTPATIENT
Start: 2025-07-23 | End: 2025-07-23

## 2025-07-23 RX ORDER — SODIUM CHLORIDE 9 G/1000ML
1000 INJECTION, SOLUTION INTRAVENOUS
Refills: 0 | Status: DISCONTINUED | OUTPATIENT
Start: 2025-07-23 | End: 2025-07-25

## 2025-07-23 RX ORDER — DOBUTAMINE 250 MG/20ML
1 INJECTION INTRAVENOUS
Qty: 500 | Refills: 0 | Status: DISCONTINUED | OUTPATIENT
Start: 2025-07-23 | End: 2025-07-23

## 2025-07-23 RX ORDER — FUROSEMIDE 10 MG/ML
40 INJECTION INTRAMUSCULAR; INTRAVENOUS DAILY
Refills: 0 | Status: DISCONTINUED | OUTPATIENT
Start: 2025-07-23 | End: 2025-07-27

## 2025-07-23 RX ORDER — ALBUMIN (HUMAN) 12.5 G/50ML
250 INJECTION, SOLUTION INTRAVENOUS ONCE
Refills: 0 | Status: COMPLETED | OUTPATIENT
Start: 2025-07-23 | End: 2025-07-23

## 2025-07-23 RX ORDER — INSULIN LISPRO 100 U/ML
2 INJECTION, SOLUTION INTRAVENOUS; SUBCUTANEOUS
Refills: 0 | Status: DISCONTINUED | OUTPATIENT
Start: 2025-07-23 | End: 2025-07-23

## 2025-07-23 RX ORDER — AMIODARONE HYDROCHLORIDE 50 MG/ML
400 INJECTION, SOLUTION INTRAVENOUS EVERY 8 HOURS
Refills: 0 | Status: DISCONTINUED | OUTPATIENT
Start: 2025-07-23 | End: 2025-07-25

## 2025-07-23 RX ORDER — INSULIN LISPRO 100 U/ML
INJECTION, SOLUTION INTRAVENOUS; SUBCUTANEOUS
Refills: 0 | Status: DISCONTINUED | OUTPATIENT
Start: 2025-07-23 | End: 2025-07-25

## 2025-07-23 RX ORDER — DEXTROSE 50 % IN WATER 50 %
15 SYRINGE (ML) INTRAVENOUS ONCE
Refills: 0 | Status: DISCONTINUED | OUTPATIENT
Start: 2025-07-23 | End: 2025-07-25

## 2025-07-23 RX ORDER — ENOXAPARIN SODIUM 100 MG/ML
40 INJECTION SUBCUTANEOUS EVERY 24 HOURS
Refills: 0 | Status: DISCONTINUED | OUTPATIENT
Start: 2025-07-23 | End: 2025-07-27

## 2025-07-23 RX ORDER — AMIODARONE HYDROCHLORIDE 50 MG/ML
INJECTION, SOLUTION INTRAVENOUS
Refills: 0 | Status: DISCONTINUED | OUTPATIENT
Start: 2025-07-23 | End: 2025-07-25

## 2025-07-23 RX ORDER — GLUCAGON 3 MG/1
1 POWDER NASAL ONCE
Refills: 0 | Status: DISCONTINUED | OUTPATIENT
Start: 2025-07-23 | End: 2025-07-25

## 2025-07-23 RX ADMIN — GABAPENTIN 100 MILLIGRAM(S): 400 CAPSULE ORAL at 22:32

## 2025-07-23 RX ADMIN — Medication 2 UNIT(S)/HR: at 21:07

## 2025-07-23 RX ADMIN — Medication 1 APPLICATION(S): at 06:45

## 2025-07-23 RX ADMIN — Medication 166.67 MILLIGRAM(S): at 20:00

## 2025-07-23 RX ADMIN — GABAPENTIN 100 MILLIGRAM(S): 400 CAPSULE ORAL at 15:40

## 2025-07-23 RX ADMIN — IRON SUCROSE 210 MILLIGRAM(S): 20 INJECTION, SOLUTION INTRAVENOUS at 01:56

## 2025-07-23 RX ADMIN — Medication 0.5 MILLIGRAM(S): at 01:55

## 2025-07-23 RX ADMIN — AMIODARONE HYDROCHLORIDE 400 MILLIGRAM(S): 50 INJECTION, SOLUTION INTRAVENOUS at 06:44

## 2025-07-23 RX ADMIN — Medication 975 MILLIGRAM(S): at 18:50

## 2025-07-23 RX ADMIN — Medication 0.5 MILLIGRAM(S): at 02:30

## 2025-07-23 RX ADMIN — Medication 100 MILLIGRAM(S): at 00:07

## 2025-07-23 RX ADMIN — OXYCODONE HYDROCHLORIDE 10 MILLIGRAM(S): 30 TABLET ORAL at 15:36

## 2025-07-23 RX ADMIN — Medication 400 MILLIGRAM(S): at 05:48

## 2025-07-23 RX ADMIN — Medication 1000 MILLIGRAM(S): at 12:38

## 2025-07-23 RX ADMIN — Medication 1000 MILLIGRAM(S): at 00:00

## 2025-07-23 RX ADMIN — Medication 81 MILLIGRAM(S): at 11:37

## 2025-07-23 RX ADMIN — ALBUMIN (HUMAN) 125 MILLILITER(S): 12.5 INJECTION, SOLUTION INTRAVENOUS at 03:17

## 2025-07-23 RX ADMIN — DEXAMETHASONE 4 MILLIGRAM(S): 0.5 TABLET ORAL at 22:33

## 2025-07-23 RX ADMIN — DOBUTAMINE 2.73 MICROGRAM(S)/KG/MIN: 250 INJECTION INTRAVENOUS at 21:07

## 2025-07-23 RX ADMIN — ENOXAPARIN SODIUM 40 MILLIGRAM(S): 100 INJECTION SUBCUTANEOUS at 08:00

## 2025-07-23 RX ADMIN — Medication 166.67 MILLIGRAM(S): at 07:50

## 2025-07-23 RX ADMIN — DEXAMETHASONE 4 MILLIGRAM(S): 0.5 TABLET ORAL at 15:40

## 2025-07-23 RX ADMIN — POLYETHYLENE GLYCOL 3350 17 GRAM(S): 17 POWDER, FOR SOLUTION ORAL at 11:38

## 2025-07-23 RX ADMIN — Medication 40 MILLIGRAM(S): at 11:38

## 2025-07-23 RX ADMIN — ATORVASTATIN CALCIUM 80 MILLIGRAM(S): 80 TABLET, FILM COATED ORAL at 22:33

## 2025-07-23 RX ADMIN — Medication 500 MILLIGRAM(S): at 06:45

## 2025-07-23 RX ADMIN — Medication 2 TABLET(S): at 22:32

## 2025-07-23 RX ADMIN — Medication 100 MILLIGRAM(S): at 08:20

## 2025-07-23 RX ADMIN — Medication 500 MILLIGRAM(S): at 17:51

## 2025-07-23 RX ADMIN — AMIODARONE HYDROCHLORIDE 618 MILLIGRAM(S): 50 INJECTION, SOLUTION INTRAVENOUS at 03:19

## 2025-07-23 RX ADMIN — INSULIN GLARGINE-YFGN 8 UNIT(S): 100 INJECTION, SOLUTION SUBCUTANEOUS at 22:16

## 2025-07-23 RX ADMIN — AMIODARONE HYDROCHLORIDE 400 MILLIGRAM(S): 50 INJECTION, SOLUTION INTRAVENOUS at 15:39

## 2025-07-23 RX ADMIN — Medication 400 MILLIGRAM(S): at 11:38

## 2025-07-23 RX ADMIN — DEXAMETHASONE 4 MILLIGRAM(S): 0.5 TABLET ORAL at 06:45

## 2025-07-23 RX ADMIN — OXYCODONE HYDROCHLORIDE 5 MILLIGRAM(S): 30 TABLET ORAL at 10:15

## 2025-07-23 RX ADMIN — GABAPENTIN 100 MILLIGRAM(S): 400 CAPSULE ORAL at 06:45

## 2025-07-23 RX ADMIN — Medication 100 MILLIGRAM(S): at 15:40

## 2025-07-23 RX ADMIN — Medication 975 MILLIGRAM(S): at 22:32

## 2025-07-23 RX ADMIN — AMIODARONE HYDROCHLORIDE 400 MILLIGRAM(S): 50 INJECTION, SOLUTION INTRAVENOUS at 22:32

## 2025-07-23 RX ADMIN — OXYCODONE HYDROCHLORIDE 10 MILLIGRAM(S): 30 TABLET ORAL at 14:36

## 2025-07-23 RX ADMIN — OXYCODONE HYDROCHLORIDE 5 MILLIGRAM(S): 30 TABLET ORAL at 09:15

## 2025-07-23 RX ADMIN — FUROSEMIDE 40 MILLIGRAM(S): 10 INJECTION INTRAMUSCULAR; INTRAVENOUS at 10:38

## 2025-07-23 RX ADMIN — MILRINONE LACTATE 6.83 MICROGRAM(S)/KG/MIN: 1 INJECTION, SOLUTION INTRAVENOUS at 21:07

## 2025-07-23 RX ADMIN — Medication 975 MILLIGRAM(S): at 17:50

## 2025-07-23 RX ADMIN — VASOPRESSIN 9 UNIT(S)/MIN: 20 INJECTION INTRAVENOUS at 03:38

## 2025-07-24 LAB
ANION GAP SERPL CALC-SCNC: 12 MMOL/L — SIGNIFICANT CHANGE UP (ref 5–17)
BUN SERPL-MCNC: 21.5 MG/DL — HIGH (ref 8–20)
CALCIUM SERPL-MCNC: 8 MG/DL — LOW (ref 8.4–10.5)
CHLORIDE SERPL-SCNC: 101 MMOL/L — SIGNIFICANT CHANGE UP (ref 96–108)
CO2 SERPL-SCNC: 21 MMOL/L — LOW (ref 22–29)
CREAT SERPL-MCNC: 0.72 MG/DL — SIGNIFICANT CHANGE UP (ref 0.5–1.3)
EGFR: 101 ML/MIN/1.73M2 — SIGNIFICANT CHANGE UP
EGFR: 101 ML/MIN/1.73M2 — SIGNIFICANT CHANGE UP
GLUCOSE BLDC GLUCOMTR-MCNC: 124 MG/DL — HIGH (ref 70–99)
GLUCOSE BLDC GLUCOMTR-MCNC: 124 MG/DL — HIGH (ref 70–99)
GLUCOSE BLDC GLUCOMTR-MCNC: 139 MG/DL — HIGH (ref 70–99)
GLUCOSE SERPL-MCNC: 127 MG/DL — HIGH (ref 70–99)
HCT VFR BLD CALC: 31.5 % — LOW (ref 39–50)
HGB BLD-MCNC: 10.8 G/DL — LOW (ref 13–17)
MAGNESIUM SERPL-MCNC: 2 MG/DL — SIGNIFICANT CHANGE UP (ref 1.6–2.6)
MCHC RBC-ENTMCNC: 31.9 PG — SIGNIFICANT CHANGE UP (ref 27–34)
MCHC RBC-ENTMCNC: 34.3 G/DL — SIGNIFICANT CHANGE UP (ref 32–36)
MCV RBC AUTO: 92.9 FL — SIGNIFICANT CHANGE UP (ref 80–100)
NRBC # BLD AUTO: 0 K/UL — SIGNIFICANT CHANGE UP (ref 0–0)
NRBC # FLD: 0 K/UL — SIGNIFICANT CHANGE UP (ref 0–0)
NRBC BLD AUTO-RTO: 0 /100 WBCS — SIGNIFICANT CHANGE UP (ref 0–0)
PLATELET # BLD AUTO: 118 K/UL — LOW (ref 150–400)
PMV BLD: 10.2 FL — SIGNIFICANT CHANGE UP (ref 7–13)
POTASSIUM SERPL-MCNC: 4.7 MMOL/L — SIGNIFICANT CHANGE UP (ref 3.5–5.3)
POTASSIUM SERPL-SCNC: 4.7 MMOL/L — SIGNIFICANT CHANGE UP (ref 3.5–5.3)
RBC # BLD: 3.39 M/UL — LOW (ref 4.2–5.8)
RBC # FLD: 13.3 % — SIGNIFICANT CHANGE UP (ref 10.3–14.5)
SODIUM SERPL-SCNC: 134 MMOL/L — LOW (ref 135–145)
WBC # BLD: 10.01 K/UL — SIGNIFICANT CHANGE UP (ref 3.8–10.5)
WBC # FLD AUTO: 10.01 K/UL — SIGNIFICANT CHANGE UP (ref 3.8–10.5)

## 2025-07-24 PROCEDURE — 99291 CRITICAL CARE FIRST HOUR: CPT

## 2025-07-24 PROCEDURE — 99292 CRITICAL CARE ADDL 30 MIN: CPT

## 2025-07-24 PROCEDURE — 93010 ELECTROCARDIOGRAM REPORT: CPT

## 2025-07-24 PROCEDURE — 99024 POSTOP FOLLOW-UP VISIT: CPT

## 2025-07-24 PROCEDURE — 71045 X-RAY EXAM CHEST 1 VIEW: CPT | Mod: 26,76

## 2025-07-24 RX ADMIN — Medication 100 MILLIGRAM(S): at 08:25

## 2025-07-24 RX ADMIN — Medication 500 MILLIGRAM(S): at 16:42

## 2025-07-24 RX ADMIN — Medication 975 MILLIGRAM(S): at 21:12

## 2025-07-24 RX ADMIN — AMIODARONE HYDROCHLORIDE 400 MILLIGRAM(S): 50 INJECTION, SOLUTION INTRAVENOUS at 21:12

## 2025-07-24 RX ADMIN — POLYETHYLENE GLYCOL 3350 17 GRAM(S): 17 POWDER, FOR SOLUTION ORAL at 11:26

## 2025-07-24 RX ADMIN — Medication 975 MILLIGRAM(S): at 05:26

## 2025-07-24 RX ADMIN — MILRINONE LACTATE 6.83 MICROGRAM(S)/KG/MIN: 1 INJECTION, SOLUTION INTRAVENOUS at 05:28

## 2025-07-24 RX ADMIN — Medication 975 MILLIGRAM(S): at 16:42

## 2025-07-24 RX ADMIN — GABAPENTIN 100 MILLIGRAM(S): 400 CAPSULE ORAL at 21:12

## 2025-07-24 RX ADMIN — IRON SUCROSE 210 MILLIGRAM(S): 20 INJECTION, SOLUTION INTRAVENOUS at 01:02

## 2025-07-24 RX ADMIN — Medication 166.67 MILLIGRAM(S): at 08:26

## 2025-07-24 RX ADMIN — Medication 975 MILLIGRAM(S): at 17:44

## 2025-07-24 RX ADMIN — Medication 975 MILLIGRAM(S): at 11:26

## 2025-07-24 RX ADMIN — GABAPENTIN 100 MILLIGRAM(S): 400 CAPSULE ORAL at 05:26

## 2025-07-24 RX ADMIN — Medication 975 MILLIGRAM(S): at 12:00

## 2025-07-24 RX ADMIN — AMIODARONE HYDROCHLORIDE 400 MILLIGRAM(S): 50 INJECTION, SOLUTION INTRAVENOUS at 16:40

## 2025-07-24 RX ADMIN — Medication 1 APPLICATION(S): at 05:27

## 2025-07-24 RX ADMIN — FUROSEMIDE 40 MILLIGRAM(S): 10 INJECTION INTRAMUSCULAR; INTRAVENOUS at 05:27

## 2025-07-24 RX ADMIN — ENOXAPARIN SODIUM 40 MILLIGRAM(S): 100 INJECTION SUBCUTANEOUS at 08:26

## 2025-07-24 RX ADMIN — ATORVASTATIN CALCIUM 80 MILLIGRAM(S): 80 TABLET, FILM COATED ORAL at 21:12

## 2025-07-24 RX ADMIN — AMIODARONE HYDROCHLORIDE 400 MILLIGRAM(S): 50 INJECTION, SOLUTION INTRAVENOUS at 05:27

## 2025-07-24 RX ADMIN — Medication 81 MILLIGRAM(S): at 11:25

## 2025-07-24 RX ADMIN — Medication 40 MILLIGRAM(S): at 11:26

## 2025-07-24 RX ADMIN — Medication 500 MILLIGRAM(S): at 05:27

## 2025-07-24 RX ADMIN — Medication 100 MILLIGRAM(S): at 00:20

## 2025-07-24 RX ADMIN — Medication 2 TABLET(S): at 21:12

## 2025-07-25 ENCOUNTER — TRANSCRIPTION ENCOUNTER (OUTPATIENT)
Age: 65
End: 2025-07-25

## 2025-07-25 LAB
ANION GAP SERPL CALC-SCNC: 12 MMOL/L — SIGNIFICANT CHANGE UP (ref 5–17)
BUN SERPL-MCNC: 28.7 MG/DL — HIGH (ref 8–20)
CALCIUM SERPL-MCNC: 8.1 MG/DL — LOW (ref 8.4–10.5)
CHLORIDE SERPL-SCNC: 101 MMOL/L — SIGNIFICANT CHANGE UP (ref 96–108)
CO2 SERPL-SCNC: 20 MMOL/L — LOW (ref 22–29)
CREAT SERPL-MCNC: 0.72 MG/DL — SIGNIFICANT CHANGE UP (ref 0.5–1.3)
EGFR: 101 ML/MIN/1.73M2 — SIGNIFICANT CHANGE UP
EGFR: 101 ML/MIN/1.73M2 — SIGNIFICANT CHANGE UP
GAS PNL BLDA: SIGNIFICANT CHANGE UP
GLUCOSE BLDC GLUCOMTR-MCNC: 104 MG/DL — HIGH (ref 70–99)
GLUCOSE SERPL-MCNC: 118 MG/DL — HIGH (ref 70–99)
HCT VFR BLD CALC: 31 % — LOW (ref 39–50)
HGB BLD-MCNC: 10.7 G/DL — LOW (ref 13–17)
MAGNESIUM SERPL-MCNC: 1.9 MG/DL — SIGNIFICANT CHANGE UP (ref 1.6–2.6)
MCHC RBC-ENTMCNC: 31.8 PG — SIGNIFICANT CHANGE UP (ref 27–34)
MCHC RBC-ENTMCNC: 34.5 G/DL — SIGNIFICANT CHANGE UP (ref 32–36)
MCV RBC AUTO: 92.3 FL — SIGNIFICANT CHANGE UP (ref 80–100)
NRBC # BLD AUTO: 0 K/UL — SIGNIFICANT CHANGE UP (ref 0–0)
NRBC # FLD: 0 K/UL — SIGNIFICANT CHANGE UP (ref 0–0)
NRBC BLD AUTO-RTO: 0 /100 WBCS — SIGNIFICANT CHANGE UP (ref 0–0)
PLATELET # BLD AUTO: 113 K/UL — LOW (ref 150–400)
PMV BLD: 10.8 FL — SIGNIFICANT CHANGE UP (ref 7–13)
POTASSIUM SERPL-MCNC: 4 MMOL/L — SIGNIFICANT CHANGE UP (ref 3.5–5.3)
POTASSIUM SERPL-SCNC: 4 MMOL/L — SIGNIFICANT CHANGE UP (ref 3.5–5.3)
RBC # BLD: 3.36 M/UL — LOW (ref 4.2–5.8)
RBC # FLD: 13.2 % — SIGNIFICANT CHANGE UP (ref 10.3–14.5)
SODIUM SERPL-SCNC: 133 MMOL/L — LOW (ref 135–145)
WBC # BLD: 9.64 K/UL — SIGNIFICANT CHANGE UP (ref 3.8–10.5)
WBC # FLD AUTO: 9.64 K/UL — SIGNIFICANT CHANGE UP (ref 3.8–10.5)

## 2025-07-25 PROCEDURE — 84295 ASSAY OF SERUM SODIUM: CPT

## 2025-07-25 PROCEDURE — 80053 COMPREHEN METABOLIC PANEL: CPT

## 2025-07-25 PROCEDURE — 83605 ASSAY OF LACTIC ACID: CPT

## 2025-07-25 PROCEDURE — P9045: CPT

## 2025-07-25 PROCEDURE — 82803 BLOOD GASES ANY COMBINATION: CPT

## 2025-07-25 PROCEDURE — 86965 POOLING BLOOD PLATELETS: CPT

## 2025-07-25 PROCEDURE — 94760 N-INVAS EAR/PLS OXIMETRY 1: CPT

## 2025-07-25 PROCEDURE — 82435 ASSAY OF BLOOD CHLORIDE: CPT

## 2025-07-25 PROCEDURE — 82553 CREATINE MB FRACTION: CPT

## 2025-07-25 PROCEDURE — 93320 DOPPLER ECHO COMPLETE: CPT

## 2025-07-25 PROCEDURE — 85014 HEMATOCRIT: CPT

## 2025-07-25 PROCEDURE — 80048 BASIC METABOLIC PNL TOTAL CA: CPT

## 2025-07-25 PROCEDURE — C1769: CPT

## 2025-07-25 PROCEDURE — 85027 COMPLETE CBC AUTOMATED: CPT

## 2025-07-25 PROCEDURE — 85384 FIBRINOGEN ACTIVITY: CPT

## 2025-07-25 PROCEDURE — C1751: CPT

## 2025-07-25 PROCEDURE — P9037: CPT

## 2025-07-25 PROCEDURE — 71045 X-RAY EXAM CHEST 1 VIEW: CPT | Mod: 26

## 2025-07-25 PROCEDURE — P9012: CPT

## 2025-07-25 PROCEDURE — 84484 ASSAY OF TROPONIN QUANT: CPT

## 2025-07-25 PROCEDURE — 76376 3D RENDER W/INTRP POSTPROCES: CPT

## 2025-07-25 PROCEDURE — 99024 POSTOP FOLLOW-UP VISIT: CPT

## 2025-07-25 PROCEDURE — 82962 GLUCOSE BLOOD TEST: CPT

## 2025-07-25 PROCEDURE — 83735 ASSAY OF MAGNESIUM: CPT

## 2025-07-25 PROCEDURE — 94002 VENT MGMT INPAT INIT DAY: CPT

## 2025-07-25 PROCEDURE — 85730 THROMBOPLASTIN TIME PARTIAL: CPT

## 2025-07-25 PROCEDURE — 84100 ASSAY OF PHOSPHORUS: CPT

## 2025-07-25 PROCEDURE — 36415 COLL VENOUS BLD VENIPUNCTURE: CPT

## 2025-07-25 PROCEDURE — 85025 COMPLETE CBC W/AUTO DIFF WBC: CPT

## 2025-07-25 PROCEDURE — C1889: CPT

## 2025-07-25 PROCEDURE — 99292 CRITICAL CARE ADDL 30 MIN: CPT

## 2025-07-25 PROCEDURE — 99291 CRITICAL CARE FIRST HOUR: CPT

## 2025-07-25 PROCEDURE — 82947 ASSAY GLUCOSE BLOOD QUANT: CPT

## 2025-07-25 PROCEDURE — 93312 ECHO TRANSESOPHAGEAL: CPT

## 2025-07-25 PROCEDURE — 85610 PROTHROMBIN TIME: CPT

## 2025-07-25 PROCEDURE — 82330 ASSAY OF CALCIUM: CPT

## 2025-07-25 PROCEDURE — 36430 TRANSFUSION BLD/BLD COMPNT: CPT

## 2025-07-25 PROCEDURE — 71045 X-RAY EXAM CHEST 1 VIEW: CPT

## 2025-07-25 PROCEDURE — 86891 AUTOLOGOUS BLOOD OP SALVAGE: CPT

## 2025-07-25 PROCEDURE — 85018 HEMOGLOBIN: CPT

## 2025-07-25 PROCEDURE — 93325 DOPPLER ECHO COLOR FLOW MAPG: CPT

## 2025-07-25 PROCEDURE — 31720 CLEARANCE OF AIRWAYS: CPT

## 2025-07-25 PROCEDURE — 82550 ASSAY OF CK (CPK): CPT

## 2025-07-25 PROCEDURE — 84132 ASSAY OF SERUM POTASSIUM: CPT

## 2025-07-25 PROCEDURE — 93005 ELECTROCARDIOGRAM TRACING: CPT

## 2025-07-25 PROCEDURE — P9100: CPT

## 2025-07-25 RX ORDER — MAGNESIUM OXIDE 400 MG
400 TABLET ORAL DAILY
Refills: 0 | Status: DISCONTINUED | OUTPATIENT
Start: 2025-07-25 | End: 2025-07-27

## 2025-07-25 RX ORDER — MAGNESIUM SULFATE 500 MG/ML
2 SYRINGE (ML) INJECTION ONCE
Refills: 0 | Status: COMPLETED | OUTPATIENT
Start: 2025-07-25 | End: 2025-07-25

## 2025-07-25 RX ORDER — AMIODARONE HYDROCHLORIDE 50 MG/ML
200 INJECTION, SOLUTION INTRAVENOUS DAILY
Refills: 0 | Status: DISCONTINUED | OUTPATIENT
Start: 2025-07-25 | End: 2025-07-27

## 2025-07-25 RX ORDER — SPIRONOLACTONE 25 MG
25 TABLET ORAL DAILY
Refills: 0 | Status: DISCONTINUED | OUTPATIENT
Start: 2025-07-25 | End: 2025-07-27

## 2025-07-25 RX ORDER — FUROSEMIDE 10 MG/ML
20 INJECTION INTRAMUSCULAR; INTRAVENOUS ONCE
Refills: 0 | Status: COMPLETED | OUTPATIENT
Start: 2025-07-25 | End: 2025-07-25

## 2025-07-25 RX ADMIN — Medication 500 MILLIGRAM(S): at 18:35

## 2025-07-25 RX ADMIN — Medication 500 MILLIGRAM(S): at 05:00

## 2025-07-25 RX ADMIN — FUROSEMIDE 40 MILLIGRAM(S): 10 INJECTION INTRAMUSCULAR; INTRAVENOUS at 05:00

## 2025-07-25 RX ADMIN — GABAPENTIN 100 MILLIGRAM(S): 400 CAPSULE ORAL at 21:26

## 2025-07-25 RX ADMIN — Medication 975 MILLIGRAM(S): at 18:35

## 2025-07-25 RX ADMIN — Medication 1 APPLICATION(S): at 05:00

## 2025-07-25 RX ADMIN — Medication 40 MILLIEQUIVALENT(S): at 05:04

## 2025-07-25 RX ADMIN — Medication 975 MILLIGRAM(S): at 05:00

## 2025-07-25 RX ADMIN — Medication 975 MILLIGRAM(S): at 11:25

## 2025-07-25 RX ADMIN — IRON SUCROSE 210 MILLIGRAM(S): 20 INJECTION, SOLUTION INTRAVENOUS at 01:18

## 2025-07-25 RX ADMIN — FUROSEMIDE 20 MILLIGRAM(S): 10 INJECTION INTRAMUSCULAR; INTRAVENOUS at 13:26

## 2025-07-25 RX ADMIN — Medication 40 MILLIGRAM(S): at 11:27

## 2025-07-25 RX ADMIN — ATORVASTATIN CALCIUM 80 MILLIGRAM(S): 80 TABLET, FILM COATED ORAL at 21:25

## 2025-07-25 RX ADMIN — Medication 2 TABLET(S): at 21:25

## 2025-07-25 RX ADMIN — Medication 975 MILLIGRAM(S): at 12:00

## 2025-07-25 RX ADMIN — Medication 81 MILLIGRAM(S): at 11:25

## 2025-07-25 RX ADMIN — Medication 25 GRAM(S): at 05:04

## 2025-07-25 RX ADMIN — GABAPENTIN 100 MILLIGRAM(S): 400 CAPSULE ORAL at 05:00

## 2025-07-25 RX ADMIN — ENOXAPARIN SODIUM 40 MILLIGRAM(S): 100 INJECTION SUBCUTANEOUS at 11:27

## 2025-07-25 RX ADMIN — Medication 400 MILLIGRAM(S): at 11:25

## 2025-07-25 RX ADMIN — GABAPENTIN 100 MILLIGRAM(S): 400 CAPSULE ORAL at 13:26

## 2025-07-25 RX ADMIN — AMIODARONE HYDROCHLORIDE 400 MILLIGRAM(S): 50 INJECTION, SOLUTION INTRAVENOUS at 05:00

## 2025-07-25 RX ADMIN — Medication 25 MILLIGRAM(S): at 13:26

## 2025-07-26 ENCOUNTER — RESULT REVIEW (OUTPATIENT)
Age: 65
End: 2025-07-26

## 2025-07-26 LAB
ANION GAP SERPL CALC-SCNC: 11 MMOL/L — SIGNIFICANT CHANGE UP (ref 5–17)
BUN SERPL-MCNC: 21.9 MG/DL — HIGH (ref 8–20)
CALCIUM SERPL-MCNC: 8.4 MG/DL — SIGNIFICANT CHANGE UP (ref 8.4–10.5)
CHLORIDE SERPL-SCNC: 101 MMOL/L — SIGNIFICANT CHANGE UP (ref 96–108)
CO2 SERPL-SCNC: 23 MMOL/L — SIGNIFICANT CHANGE UP (ref 22–29)
CREAT SERPL-MCNC: 0.64 MG/DL — SIGNIFICANT CHANGE UP (ref 0.5–1.3)
EGFR: 104 ML/MIN/1.73M2 — SIGNIFICANT CHANGE UP
EGFR: 104 ML/MIN/1.73M2 — SIGNIFICANT CHANGE UP
GLUCOSE SERPL-MCNC: 93 MG/DL — SIGNIFICANT CHANGE UP (ref 70–99)
HCT VFR BLD CALC: 33.6 % — LOW (ref 39–50)
HGB BLD-MCNC: 11.5 G/DL — LOW (ref 13–17)
MAGNESIUM SERPL-MCNC: 2 MG/DL — SIGNIFICANT CHANGE UP (ref 1.6–2.6)
MCHC RBC-ENTMCNC: 31.5 PG — SIGNIFICANT CHANGE UP (ref 27–34)
MCHC RBC-ENTMCNC: 34.2 G/DL — SIGNIFICANT CHANGE UP (ref 32–36)
MCV RBC AUTO: 92.1 FL — SIGNIFICANT CHANGE UP (ref 80–100)
NRBC # BLD AUTO: 0 K/UL — SIGNIFICANT CHANGE UP (ref 0–0)
NRBC # FLD: 0 K/UL — SIGNIFICANT CHANGE UP (ref 0–0)
NRBC BLD AUTO-RTO: 0 /100 WBCS — SIGNIFICANT CHANGE UP (ref 0–0)
PLATELET # BLD AUTO: 115 K/UL — LOW (ref 150–400)
PMV BLD: 10.7 FL — SIGNIFICANT CHANGE UP (ref 7–13)
POTASSIUM SERPL-MCNC: 4.1 MMOL/L — SIGNIFICANT CHANGE UP (ref 3.5–5.3)
POTASSIUM SERPL-SCNC: 4.1 MMOL/L — SIGNIFICANT CHANGE UP (ref 3.5–5.3)
RBC # BLD: 3.65 M/UL — LOW (ref 4.2–5.8)
RBC # FLD: 13.2 % — SIGNIFICANT CHANGE UP (ref 10.3–14.5)
SODIUM SERPL-SCNC: 135 MMOL/L — SIGNIFICANT CHANGE UP (ref 135–145)
SURGICAL PATHOLOGY STUDY: SIGNIFICANT CHANGE UP
WBC # BLD: 7.99 K/UL — SIGNIFICANT CHANGE UP (ref 3.8–10.5)
WBC # FLD AUTO: 7.99 K/UL — SIGNIFICANT CHANGE UP (ref 3.8–10.5)

## 2025-07-26 PROCEDURE — 93306 TTE W/DOPPLER COMPLETE: CPT | Mod: 26

## 2025-07-26 PROCEDURE — 71045 X-RAY EXAM CHEST 1 VIEW: CPT | Mod: 26

## 2025-07-26 RX ADMIN — Medication 40 MILLIGRAM(S): at 11:21

## 2025-07-26 RX ADMIN — ATORVASTATIN CALCIUM 80 MILLIGRAM(S): 80 TABLET, FILM COATED ORAL at 21:05

## 2025-07-26 RX ADMIN — GABAPENTIN 100 MILLIGRAM(S): 400 CAPSULE ORAL at 14:45

## 2025-07-26 RX ADMIN — Medication 400 MILLIGRAM(S): at 11:20

## 2025-07-26 RX ADMIN — ENOXAPARIN SODIUM 40 MILLIGRAM(S): 100 INJECTION SUBCUTANEOUS at 11:21

## 2025-07-26 RX ADMIN — GABAPENTIN 100 MILLIGRAM(S): 400 CAPSULE ORAL at 05:08

## 2025-07-26 RX ADMIN — Medication 500 MILLIGRAM(S): at 17:00

## 2025-07-26 RX ADMIN — Medication 1 APPLICATION(S): at 05:11

## 2025-07-26 RX ADMIN — Medication 25 MILLIGRAM(S): at 05:08

## 2025-07-26 RX ADMIN — FUROSEMIDE 40 MILLIGRAM(S): 10 INJECTION INTRAMUSCULAR; INTRAVENOUS at 05:08

## 2025-07-26 RX ADMIN — GABAPENTIN 100 MILLIGRAM(S): 400 CAPSULE ORAL at 21:05

## 2025-07-26 RX ADMIN — Medication 1 LOZENGE: at 11:20

## 2025-07-26 RX ADMIN — Medication 975 MILLIGRAM(S): at 00:22

## 2025-07-26 RX ADMIN — Medication 81 MILLIGRAM(S): at 11:21

## 2025-07-26 RX ADMIN — Medication 500 MILLIGRAM(S): at 05:08

## 2025-07-27 VITALS
RESPIRATION RATE: 17 BRPM | DIASTOLIC BLOOD PRESSURE: 84 MMHG | SYSTOLIC BLOOD PRESSURE: 125 MMHG | TEMPERATURE: 98 F | HEART RATE: 68 BPM | OXYGEN SATURATION: 98 %

## 2025-07-27 LAB
ANION GAP SERPL CALC-SCNC: 12 MMOL/L — SIGNIFICANT CHANGE UP (ref 5–17)
BUN SERPL-MCNC: 21.6 MG/DL — HIGH (ref 8–20)
CALCIUM SERPL-MCNC: 8.5 MG/DL — SIGNIFICANT CHANGE UP (ref 8.4–10.5)
CHLORIDE SERPL-SCNC: 101 MMOL/L — SIGNIFICANT CHANGE UP (ref 96–108)
CO2 SERPL-SCNC: 23 MMOL/L — SIGNIFICANT CHANGE UP (ref 22–29)
CREAT SERPL-MCNC: 0.68 MG/DL — SIGNIFICANT CHANGE UP (ref 0.5–1.3)
EGFR: 103 ML/MIN/1.73M2 — SIGNIFICANT CHANGE UP
EGFR: 103 ML/MIN/1.73M2 — SIGNIFICANT CHANGE UP
GLUCOSE SERPL-MCNC: 96 MG/DL — SIGNIFICANT CHANGE UP (ref 70–99)
HCT VFR BLD CALC: 33.4 % — LOW (ref 39–50)
HGB BLD-MCNC: 11.8 G/DL — LOW (ref 13–17)
MAGNESIUM SERPL-MCNC: 1.9 MG/DL — SIGNIFICANT CHANGE UP (ref 1.6–2.6)
MCHC RBC-ENTMCNC: 32.2 PG — SIGNIFICANT CHANGE UP (ref 27–34)
MCHC RBC-ENTMCNC: 35.3 G/DL — SIGNIFICANT CHANGE UP (ref 32–36)
MCV RBC AUTO: 91 FL — SIGNIFICANT CHANGE UP (ref 80–100)
NRBC # BLD AUTO: 0 K/UL — SIGNIFICANT CHANGE UP (ref 0–0)
NRBC # FLD: 0 K/UL — SIGNIFICANT CHANGE UP (ref 0–0)
NRBC BLD AUTO-RTO: 0 /100 WBCS — SIGNIFICANT CHANGE UP (ref 0–0)
PLATELET # BLD AUTO: 133 K/UL — LOW (ref 150–400)
PMV BLD: 10.2 FL — SIGNIFICANT CHANGE UP (ref 7–13)
POTASSIUM SERPL-MCNC: 4 MMOL/L — SIGNIFICANT CHANGE UP (ref 3.5–5.3)
POTASSIUM SERPL-SCNC: 4 MMOL/L — SIGNIFICANT CHANGE UP (ref 3.5–5.3)
RBC # BLD: 3.67 M/UL — LOW (ref 4.2–5.8)
RBC # FLD: 12.9 % — SIGNIFICANT CHANGE UP (ref 10.3–14.5)
SODIUM SERPL-SCNC: 136 MMOL/L — SIGNIFICANT CHANGE UP (ref 135–145)
WBC # BLD: 7.14 K/UL — SIGNIFICANT CHANGE UP (ref 3.8–10.5)
WBC # FLD AUTO: 7.14 K/UL — SIGNIFICANT CHANGE UP (ref 3.8–10.5)

## 2025-07-27 PROCEDURE — 97530 THERAPEUTIC ACTIVITIES: CPT

## 2025-07-27 PROCEDURE — 36430 TRANSFUSION BLD/BLD COMPNT: CPT

## 2025-07-27 PROCEDURE — 36415 COLL VENOUS BLD VENIPUNCTURE: CPT

## 2025-07-27 PROCEDURE — 80053 COMPREHEN METABOLIC PANEL: CPT

## 2025-07-27 PROCEDURE — 88305 TISSUE EXAM BY PATHOLOGIST: CPT

## 2025-07-27 PROCEDURE — 85014 HEMATOCRIT: CPT

## 2025-07-27 PROCEDURE — 82962 GLUCOSE BLOOD TEST: CPT

## 2025-07-27 PROCEDURE — 86965 POOLING BLOOD PLATELETS: CPT

## 2025-07-27 PROCEDURE — 71045 X-RAY EXAM CHEST 1 VIEW: CPT

## 2025-07-27 PROCEDURE — 82947 ASSAY GLUCOSE BLOOD QUANT: CPT

## 2025-07-27 PROCEDURE — 94002 VENT MGMT INPAT INIT DAY: CPT

## 2025-07-27 PROCEDURE — 99024 POSTOP FOLLOW-UP VISIT: CPT

## 2025-07-27 PROCEDURE — C1751: CPT

## 2025-07-27 PROCEDURE — P9045: CPT

## 2025-07-27 PROCEDURE — 84132 ASSAY OF SERUM POTASSIUM: CPT

## 2025-07-27 PROCEDURE — 83605 ASSAY OF LACTIC ACID: CPT

## 2025-07-27 PROCEDURE — 86891 AUTOLOGOUS BLOOD OP SALVAGE: CPT

## 2025-07-27 PROCEDURE — 82550 ASSAY OF CK (CPK): CPT

## 2025-07-27 PROCEDURE — 93312 ECHO TRANSESOPHAGEAL: CPT

## 2025-07-27 PROCEDURE — 83735 ASSAY OF MAGNESIUM: CPT

## 2025-07-27 PROCEDURE — 85027 COMPLETE CBC AUTOMATED: CPT

## 2025-07-27 PROCEDURE — C1889: CPT

## 2025-07-27 PROCEDURE — 84100 ASSAY OF PHOSPHORUS: CPT

## 2025-07-27 PROCEDURE — 76376 3D RENDER W/INTRP POSTPROCES: CPT

## 2025-07-27 PROCEDURE — 31720 CLEARANCE OF AIRWAYS: CPT

## 2025-07-27 PROCEDURE — 93005 ELECTROCARDIOGRAM TRACING: CPT

## 2025-07-27 PROCEDURE — 85610 PROTHROMBIN TIME: CPT

## 2025-07-27 PROCEDURE — C1769: CPT

## 2025-07-27 PROCEDURE — 85384 FIBRINOGEN ACTIVITY: CPT

## 2025-07-27 PROCEDURE — P9037: CPT

## 2025-07-27 PROCEDURE — 85730 THROMBOPLASTIN TIME PARTIAL: CPT

## 2025-07-27 PROCEDURE — 84295 ASSAY OF SERUM SODIUM: CPT

## 2025-07-27 PROCEDURE — 82553 CREATINE MB FRACTION: CPT

## 2025-07-27 PROCEDURE — P9012: CPT

## 2025-07-27 PROCEDURE — 85018 HEMOGLOBIN: CPT

## 2025-07-27 PROCEDURE — 85025 COMPLETE CBC W/AUTO DIFF WBC: CPT

## 2025-07-27 PROCEDURE — 80048 BASIC METABOLIC PNL TOTAL CA: CPT

## 2025-07-27 PROCEDURE — 84484 ASSAY OF TROPONIN QUANT: CPT

## 2025-07-27 PROCEDURE — 97116 GAIT TRAINING THERAPY: CPT

## 2025-07-27 PROCEDURE — 82803 BLOOD GASES ANY COMBINATION: CPT

## 2025-07-27 PROCEDURE — 71045 X-RAY EXAM CHEST 1 VIEW: CPT | Mod: 26

## 2025-07-27 PROCEDURE — 93320 DOPPLER ECHO COMPLETE: CPT

## 2025-07-27 PROCEDURE — 82435 ASSAY OF BLOOD CHLORIDE: CPT

## 2025-07-27 PROCEDURE — 94760 N-INVAS EAR/PLS OXIMETRY 1: CPT

## 2025-07-27 PROCEDURE — P9047: CPT

## 2025-07-27 PROCEDURE — C8929: CPT

## 2025-07-27 PROCEDURE — 93325 DOPPLER ECHO COLOR FLOW MAPG: CPT

## 2025-07-27 PROCEDURE — 82330 ASSAY OF CALCIUM: CPT

## 2025-07-27 PROCEDURE — P9100: CPT

## 2025-07-27 RX ORDER — APIXABAN 5 MG/1
1 TABLET, FILM COATED ORAL
Qty: 0 | Refills: 0 | DISCHARGE

## 2025-07-27 RX ORDER — ASPIRIN 325 MG
1 TABLET ORAL
Qty: 30 | Refills: 1
Start: 2025-07-27 | End: 2025-09-24

## 2025-07-27 RX ORDER — AMIODARONE HYDROCHLORIDE 50 MG/ML
1 INJECTION, SOLUTION INTRAVENOUS
Qty: 30 | Refills: 1
Start: 2025-07-27 | End: 2025-09-24

## 2025-07-27 RX ORDER — SPIRONOLACTONE 25 MG
1 TABLET ORAL
Qty: 7 | Refills: 0
Start: 2025-07-27 | End: 2025-08-02

## 2025-07-27 RX ORDER — SENNA 187 MG
2 TABLET ORAL
Qty: 14 | Refills: 0
Start: 2025-07-27 | End: 2025-08-02

## 2025-07-27 RX ORDER — ACETAMINOPHEN 500 MG/5ML
2 LIQUID (ML) ORAL
Qty: 0 | Refills: 0 | DISCHARGE
Start: 2025-07-27

## 2025-07-27 RX ORDER — FUROSEMIDE 10 MG/ML
1 INJECTION INTRAMUSCULAR; INTRAVENOUS
Qty: 7 | Refills: 0
Start: 2025-07-27 | End: 2025-08-02

## 2025-07-27 RX ORDER — METOPROLOL SUCCINATE 50 MG/1
1 TABLET, EXTENDED RELEASE ORAL
Refills: 0 | DISCHARGE

## 2025-07-27 RX ORDER — OXYCODONE HYDROCHLORIDE 30 MG/1
1 TABLET ORAL
Qty: 28 | Refills: 0
Start: 2025-07-27 | End: 2025-08-02

## 2025-07-27 RX ORDER — MAGNESIUM SULFATE 500 MG/ML
2 SYRINGE (ML) INJECTION ONCE
Refills: 0 | Status: DISCONTINUED | OUTPATIENT
Start: 2025-07-27 | End: 2025-07-27

## 2025-07-27 RX ADMIN — Medication 40 MILLIGRAM(S): at 11:15

## 2025-07-27 RX ADMIN — Medication 1 APPLICATION(S): at 05:35

## 2025-07-27 RX ADMIN — Medication 400 MILLIGRAM(S): at 11:15

## 2025-07-27 RX ADMIN — Medication 81 MILLIGRAM(S): at 11:15

## 2025-07-27 RX ADMIN — GABAPENTIN 100 MILLIGRAM(S): 400 CAPSULE ORAL at 05:35

## 2025-07-27 RX ADMIN — FUROSEMIDE 40 MILLIGRAM(S): 10 INJECTION INTRAMUSCULAR; INTRAVENOUS at 05:35

## 2025-07-27 RX ADMIN — ENOXAPARIN SODIUM 40 MILLIGRAM(S): 100 INJECTION SUBCUTANEOUS at 11:15

## 2025-07-27 RX ADMIN — AMIODARONE HYDROCHLORIDE 200 MILLIGRAM(S): 50 INJECTION, SOLUTION INTRAVENOUS at 05:34

## 2025-07-27 RX ADMIN — Medication 25 MILLIGRAM(S): at 05:35

## 2025-07-27 RX ADMIN — Medication 500 MILLIGRAM(S): at 05:34

## 2025-07-28 ENCOUNTER — NON-APPOINTMENT (OUTPATIENT)
Age: 65
End: 2025-07-28

## 2025-07-28 PROBLEM — E78.5 HYPERLIPIDEMIA, UNSPECIFIED: Chronic | Status: ACTIVE | Noted: 2025-07-08

## 2025-07-28 PROBLEM — I10 ESSENTIAL (PRIMARY) HYPERTENSION: Chronic | Status: ACTIVE | Noted: 2025-07-08

## 2025-07-28 PROBLEM — I25.10 ATHEROSCLEROTIC HEART DISEASE OF NATIVE CORONARY ARTERY WITHOUT ANGINA PECTORIS: Chronic | Status: ACTIVE | Noted: 2025-07-08

## 2025-07-28 PROBLEM — G47.33 OBSTRUCTIVE SLEEP APNEA (ADULT) (PEDIATRIC): Chronic | Status: ACTIVE | Noted: 2025-07-08

## 2025-07-29 ENCOUNTER — APPOINTMENT (OUTPATIENT)
Dept: CARE COORDINATION | Facility: HOME HEALTH | Age: 65
End: 2025-07-29
Payer: MEDICARE

## 2025-07-29 VITALS
BODY MASS INDEX: 26.68 KG/M2 | RESPIRATION RATE: 16 BRPM | SYSTOLIC BLOOD PRESSURE: 104 MMHG | OXYGEN SATURATION: 97 % | DIASTOLIC BLOOD PRESSURE: 64 MMHG | HEART RATE: 70 BPM | WEIGHT: 197 LBS | HEIGHT: 72 IN

## 2025-07-29 PROCEDURE — 99024 POSTOP FOLLOW-UP VISIT: CPT

## 2025-07-29 RX ORDER — AMIODARONE HYDROCHLORIDE 200 MG/1
200 TABLET ORAL
Refills: 0 | Status: ACTIVE | COMMUNITY

## 2025-07-30 PROBLEM — Z98.890 S/P MITRAL VALVE REPAIR: Status: ACTIVE | Noted: 2025-07-29

## 2025-07-30 PROBLEM — Z95.1 S/P CABG (CORONARY ARTERY BYPASS GRAFT): Status: ACTIVE | Noted: 2025-07-29

## 2025-08-03 ENCOUNTER — EMERGENCY (EMERGENCY)
Facility: HOSPITAL | Age: 65
LOS: 1 days | End: 2025-08-03
Attending: EMERGENCY MEDICINE
Payer: COMMERCIAL

## 2025-08-03 VITALS
SYSTOLIC BLOOD PRESSURE: 106 MMHG | RESPIRATION RATE: 18 BRPM | OXYGEN SATURATION: 100 % | WEIGHT: 195.55 LBS | HEIGHT: 73 IN | TEMPERATURE: 98 F | DIASTOLIC BLOOD PRESSURE: 68 MMHG | HEART RATE: 72 BPM

## 2025-08-03 DIAGNOSIS — Z90.89 ACQUIRED ABSENCE OF OTHER ORGANS: Chronic | ICD-10-CM

## 2025-08-03 DIAGNOSIS — Z92.89 PERSONAL HISTORY OF OTHER MEDICAL TREATMENT: Chronic | ICD-10-CM

## 2025-08-03 DIAGNOSIS — Z98.890 OTHER SPECIFIED POSTPROCEDURAL STATES: Chronic | ICD-10-CM

## 2025-08-03 PROCEDURE — 93010 ELECTROCARDIOGRAM REPORT: CPT

## 2025-08-03 PROCEDURE — 99285 EMERGENCY DEPT VISIT HI MDM: CPT

## 2025-08-04 ENCOUNTER — RESULT REVIEW (OUTPATIENT)
Age: 65
End: 2025-08-04

## 2025-08-04 VITALS
RESPIRATION RATE: 18 BRPM | TEMPERATURE: 98 F | DIASTOLIC BLOOD PRESSURE: 80 MMHG | OXYGEN SATURATION: 97 % | SYSTOLIC BLOOD PRESSURE: 138 MMHG | HEART RATE: 74 BPM

## 2025-08-04 DIAGNOSIS — I25.10 ATHEROSCLEROTIC HEART DISEASE OF NATIVE CORONARY ARTERY WITHOUT ANGINA PECTORIS: ICD-10-CM

## 2025-08-04 DIAGNOSIS — Z95.2 PRESENCE OF PROSTHETIC HEART VALVE: ICD-10-CM

## 2025-08-04 DIAGNOSIS — I48.91 UNSPECIFIED ATRIAL FIBRILLATION: ICD-10-CM

## 2025-08-04 LAB
ALBUMIN SERPL ELPH-MCNC: 3.5 G/DL — SIGNIFICANT CHANGE UP (ref 3.3–5.2)
ALP SERPL-CCNC: 71 U/L — SIGNIFICANT CHANGE UP (ref 40–120)
ALT FLD-CCNC: 30 U/L — SIGNIFICANT CHANGE UP
ANION GAP SERPL CALC-SCNC: 9 MMOL/L — SIGNIFICANT CHANGE UP (ref 5–17)
AST SERPL-CCNC: 25 U/L — SIGNIFICANT CHANGE UP
BASOPHILS # BLD AUTO: 0.04 K/UL — SIGNIFICANT CHANGE UP (ref 0–0.2)
BASOPHILS NFR BLD AUTO: 0.3 % — SIGNIFICANT CHANGE UP (ref 0–2)
BILIRUB SERPL-MCNC: 0.6 MG/DL — SIGNIFICANT CHANGE UP (ref 0.4–2)
BUN SERPL-MCNC: 20.4 MG/DL — HIGH (ref 8–20)
CALCIUM SERPL-MCNC: 9 MG/DL — SIGNIFICANT CHANGE UP (ref 8.4–10.5)
CHLORIDE SERPL-SCNC: 99 MMOL/L — SIGNIFICANT CHANGE UP (ref 96–108)
CO2 SERPL-SCNC: 25 MMOL/L — SIGNIFICANT CHANGE UP (ref 22–29)
CREAT SERPL-MCNC: 1.05 MG/DL — SIGNIFICANT CHANGE UP (ref 0.5–1.3)
EGFR: 78 ML/MIN/1.73M2 — SIGNIFICANT CHANGE UP
EGFR: 78 ML/MIN/1.73M2 — SIGNIFICANT CHANGE UP
EOSINOPHIL # BLD AUTO: 0.31 K/UL — SIGNIFICANT CHANGE UP (ref 0–0.5)
EOSINOPHIL NFR BLD AUTO: 2.7 % — SIGNIFICANT CHANGE UP (ref 0–6)
GLUCOSE SERPL-MCNC: 107 MG/DL — HIGH (ref 70–99)
HCT VFR BLD CALC: 34.7 % — LOW (ref 39–50)
HGB BLD-MCNC: 12.2 G/DL — LOW (ref 13–17)
IMM GRANULOCYTES # BLD AUTO: 0.07 K/UL — SIGNIFICANT CHANGE UP (ref 0–0.07)
IMM GRANULOCYTES NFR BLD AUTO: 0.6 % — SIGNIFICANT CHANGE UP (ref 0–0.9)
LYMPHOCYTES # BLD AUTO: 2.11 K/UL — SIGNIFICANT CHANGE UP (ref 1–3.3)
LYMPHOCYTES NFR BLD AUTO: 18.4 % — SIGNIFICANT CHANGE UP (ref 13–44)
MAGNESIUM SERPL-MCNC: 2 MG/DL — SIGNIFICANT CHANGE UP (ref 1.6–2.6)
MCHC RBC-ENTMCNC: 32.3 PG — SIGNIFICANT CHANGE UP (ref 27–34)
MCHC RBC-ENTMCNC: 35.2 G/DL — SIGNIFICANT CHANGE UP (ref 32–36)
MCV RBC AUTO: 91.8 FL — SIGNIFICANT CHANGE UP (ref 80–100)
MONOCYTES # BLD AUTO: 0.98 K/UL — HIGH (ref 0–0.9)
MONOCYTES NFR BLD AUTO: 8.6 % — SIGNIFICANT CHANGE UP (ref 2–14)
NEUTROPHILS # BLD AUTO: 7.94 K/UL — HIGH (ref 1.8–7.4)
NEUTROPHILS NFR BLD AUTO: 69.4 % — SIGNIFICANT CHANGE UP (ref 43–77)
NRBC # BLD AUTO: 0 K/UL — SIGNIFICANT CHANGE UP (ref 0–0)
NRBC # FLD: 0 K/UL — SIGNIFICANT CHANGE UP (ref 0–0)
NRBC BLD AUTO-RTO: 0 /100 WBCS — SIGNIFICANT CHANGE UP (ref 0–0)
NT-PROBNP SERPL-SCNC: 786 PG/ML — HIGH (ref 0–300)
PLATELET # BLD AUTO: 235 K/UL — SIGNIFICANT CHANGE UP (ref 150–400)
PMV BLD: 9 FL — SIGNIFICANT CHANGE UP (ref 7–13)
POTASSIUM SERPL-MCNC: 4.3 MMOL/L — SIGNIFICANT CHANGE UP (ref 3.5–5.3)
POTASSIUM SERPL-SCNC: 4.3 MMOL/L — SIGNIFICANT CHANGE UP (ref 3.5–5.3)
PROT SERPL-MCNC: 6.6 G/DL — SIGNIFICANT CHANGE UP (ref 6.6–8.7)
RBC # BLD: 3.78 M/UL — LOW (ref 4.2–5.8)
RBC # FLD: 12.7 % — SIGNIFICANT CHANGE UP (ref 10.3–14.5)
SODIUM SERPL-SCNC: 133 MMOL/L — LOW (ref 135–145)
TROPONIN T, HIGH SENSITIVITY RESULT: 78 NG/L — HIGH (ref 0–51)
TROPONIN T, HIGH SENSITIVITY RESULT: 79 NG/L — HIGH (ref 0–51)
WBC # BLD: 11.45 K/UL — HIGH (ref 3.8–10.5)
WBC # FLD AUTO: 11.45 K/UL — HIGH (ref 3.8–10.5)

## 2025-08-04 PROCEDURE — 71045 X-RAY EXAM CHEST 1 VIEW: CPT | Mod: 26

## 2025-08-04 PROCEDURE — 93306 TTE W/DOPPLER COMPLETE: CPT

## 2025-08-04 PROCEDURE — 36415 COLL VENOUS BLD VENIPUNCTURE: CPT

## 2025-08-04 PROCEDURE — 83735 ASSAY OF MAGNESIUM: CPT

## 2025-08-04 PROCEDURE — 83880 ASSAY OF NATRIURETIC PEPTIDE: CPT

## 2025-08-04 PROCEDURE — 85025 COMPLETE CBC W/AUTO DIFF WBC: CPT

## 2025-08-04 PROCEDURE — 93306 TTE W/DOPPLER COMPLETE: CPT | Mod: 26

## 2025-08-04 PROCEDURE — 80053 COMPREHEN METABOLIC PANEL: CPT

## 2025-08-04 PROCEDURE — 84484 ASSAY OF TROPONIN QUANT: CPT

## 2025-08-04 PROCEDURE — 71045 X-RAY EXAM CHEST 1 VIEW: CPT

## 2025-08-04 PROCEDURE — 93010 ELECTROCARDIOGRAM REPORT: CPT

## 2025-08-04 PROCEDURE — 99285 EMERGENCY DEPT VISIT HI MDM: CPT | Mod: 25

## 2025-08-04 PROCEDURE — 93005 ELECTROCARDIOGRAM TRACING: CPT

## 2025-08-04 RX ORDER — METOPROLOL SUCCINATE 50 MG/1
50 TABLET, EXTENDED RELEASE ORAL DAILY
Refills: 0 | Status: DISCONTINUED | OUTPATIENT
Start: 2025-08-04 | End: 2025-08-04

## 2025-08-04 RX ORDER — FOLIC ACID 1 MG/1
1 TABLET ORAL DAILY
Refills: 0 | Status: DISCONTINUED | OUTPATIENT
Start: 2025-08-04 | End: 2025-08-04

## 2025-08-04 RX ORDER — DAPTOMYCIN 500 MG/10ML
500 INJECTION, POWDER, LYOPHILIZED, FOR SOLUTION INTRAVENOUS ONCE
Refills: 0 | Status: DISCONTINUED | OUTPATIENT
Start: 2025-08-04 | End: 2025-08-04

## 2025-08-04 RX ORDER — APIXABAN 5 MG/1
5 TABLET, FILM COATED ORAL ONCE
Refills: 0 | Status: COMPLETED | OUTPATIENT
Start: 2025-08-04 | End: 2025-08-04

## 2025-08-04 RX ORDER — AMIODARONE HYDROCHLORIDE 50 MG/ML
200 INJECTION, SOLUTION INTRAVENOUS DAILY
Refills: 0 | Status: DISCONTINUED | OUTPATIENT
Start: 2025-08-04 | End: 2025-08-11

## 2025-08-04 RX ORDER — METOPROLOL SUCCINATE 50 MG/1
25 TABLET, EXTENDED RELEASE ORAL ONCE
Refills: 0 | Status: COMPLETED | OUTPATIENT
Start: 2025-08-04 | End: 2025-08-04

## 2025-08-04 RX ORDER — ASPIRIN 325 MG
81 TABLET ORAL DAILY
Refills: 0 | Status: DISCONTINUED | OUTPATIENT
Start: 2025-08-04 | End: 2025-08-11

## 2025-08-04 RX ORDER — SODIUM BICARBONATE 1 MEQ/ML
650 SYRINGE (ML) INTRAVENOUS ONCE
Refills: 0 | Status: DISCONTINUED | OUTPATIENT
Start: 2025-08-04 | End: 2025-08-04

## 2025-08-04 RX ORDER — METOPROLOL SUCCINATE 50 MG/1
2.5 TABLET, EXTENDED RELEASE ORAL EVERY 6 HOURS
Refills: 0 | Status: DISCONTINUED | OUTPATIENT
Start: 2025-08-04 | End: 2025-08-04

## 2025-08-04 RX ORDER — METOPROLOL SUCCINATE 50 MG/1
2.5 TABLET, EXTENDED RELEASE ORAL ONCE
Refills: 0 | Status: DISCONTINUED | OUTPATIENT
Start: 2025-08-04 | End: 2025-08-04

## 2025-08-04 RX ORDER — METOPROLOL SUCCINATE 50 MG/1
2.5 TABLET, EXTENDED RELEASE ORAL ONCE
Refills: 0 | Status: COMPLETED | OUTPATIENT
Start: 2025-08-04 | End: 2025-08-04

## 2025-08-04 RX ADMIN — METOPROLOL SUCCINATE 25 MILLIGRAM(S): 50 TABLET, EXTENDED RELEASE ORAL at 03:43

## 2025-08-04 RX ADMIN — AMIODARONE HYDROCHLORIDE 200 MILLIGRAM(S): 50 INJECTION, SOLUTION INTRAVENOUS at 09:28

## 2025-08-04 RX ADMIN — Medication 81 MILLIGRAM(S): at 09:28

## 2025-08-04 RX ADMIN — APIXABAN 5 MILLIGRAM(S): 5 TABLET, FILM COATED ORAL at 09:28

## 2025-08-06 ENCOUNTER — APPOINTMENT (OUTPATIENT)
Dept: CARDIOTHORACIC SURGERY | Facility: CLINIC | Age: 65
End: 2025-08-06
Payer: MEDICARE

## 2025-08-06 VITALS
BODY MASS INDEX: 25.47 KG/M2 | WEIGHT: 188 LBS | DIASTOLIC BLOOD PRESSURE: 81 MMHG | HEART RATE: 76 BPM | HEIGHT: 72 IN | RESPIRATION RATE: 16 BRPM | SYSTOLIC BLOOD PRESSURE: 118 MMHG | OXYGEN SATURATION: 99 %

## 2025-08-06 DIAGNOSIS — Z95.1 PRESENCE OF AORTOCORONARY BYPASS GRAFT: ICD-10-CM

## 2025-08-06 DIAGNOSIS — Z98.890 OTHER SPECIFIED POSTPROCEDURAL STATES: ICD-10-CM

## 2025-08-06 PROCEDURE — 99024 POSTOP FOLLOW-UP VISIT: CPT

## 2025-08-06 RX ORDER — METOPROLOL SUCCINATE 25 MG/1
25 TABLET, EXTENDED RELEASE ORAL
Refills: 0 | Status: ACTIVE | COMMUNITY
Start: 2025-08-06

## 2025-08-06 RX ORDER — SPIRONOLACTONE 25 MG/1
25 TABLET ORAL
Refills: 0 | Status: COMPLETED | COMMUNITY
End: 2025-08-06

## 2025-08-06 RX ORDER — FUROSEMIDE 40 MG/1
40 TABLET ORAL
Refills: 0 | Status: COMPLETED | COMMUNITY
End: 2025-08-06

## 2025-08-12 ENCOUNTER — NON-APPOINTMENT (OUTPATIENT)
Age: 65
End: 2025-08-12

## (undated) DEVICE — DEVICE CLAMP ENCOMPASS SYNERGY ISOLATOR

## (undated) DEVICE — DRAPE IOBAN 33" X 23"

## (undated) DEVICE — DRSG 4 X 8"

## (undated) DEVICE — SUT SOFSILK 4-0 24" CV-15

## (undated) DEVICE — SUT SILK 5-0 60" TIES

## (undated) DEVICE — SUT ETHIBOND 1 30" OS8

## (undated) DEVICE — SUT ETHIBOND 2-0 36" SH

## (undated) DEVICE — PRESSURE INFUSOR BAG 1000ML

## (undated) DEVICE — PREP SCRUB BRUSH W CHG 4%

## (undated) DEVICE — SUT STAINLESS STEEL 5 18" SCC

## (undated) DEVICE — SOL ANTI FOG (FRED)

## (undated) DEVICE — SUT PROLENE 4-0 30" SH-1

## (undated) DEVICE — TONGUE DEPRESSOR

## (undated) DEVICE — LAP PAD 18 X 18"

## (undated) DEVICE — DRAPE TOWEL BLUE 17" X 24"

## (undated) DEVICE — SUT PROLENE 7-0 24" BV-1

## (undated) DEVICE — CONNECTOR STRAIGHT 3/8 X 3/8"

## (undated) DEVICE — SOL IRR POUR NS 0.9% 1000ML

## (undated) DEVICE — SUT MONOCRYL 4-0 27" PS-2 UNDYED

## (undated) DEVICE — SUT ETHIBOND 2-0 4-30" RB-1 WHITE

## (undated) DEVICE — Device

## (undated) DEVICE — SUT PLEDGET 9MM X 4MM X 1.5MM

## (undated) DEVICE — SOL INJ NS 0.9% 1000ML

## (undated) DEVICE — CONNECTOR STRAIGHT 3/8 X 3/8" W LUER LOCK

## (undated) DEVICE — SUT PROLENE 3-0 36" SH

## (undated) DEVICE — DRAPE CV 106" X 135"

## (undated) DEVICE — SUT ETHIBOND 2-0 30" V5 WHITE

## (undated) DEVICE — TUBING SUCTION 20FT

## (undated) DEVICE — WARMING BLANKET DUO-THERM HYPER/HYPOTHERM ADULT

## (undated) DEVICE — DRSG OPSITE 13.75 X 4"

## (undated) DEVICE — AORTIC PUNCH 5MM STANDARD HANDLE

## (undated) DEVICE — DRAPE SLUSH / WARMER 44 X 66"

## (undated) DEVICE — SUT SILK 0 30" SH

## (undated) DEVICE — CONNECTOR "Y" 1/2 X 3/8 X 3/8"

## (undated) DEVICE — VESSEL LOOP EXTRA MAXI-BLUE 0.200" X 22"

## (undated) DEVICE — MARKING PEN W RULER

## (undated) DEVICE — CHEST DRAIN PLEUR-EVAC DRY/WET ADULT-PEDS SINGLE (QUICK)

## (undated) DEVICE — ELCTR MULTIFUNCTION DEFIBRILLATION ELECTRODE EDGE SYSTEM ADULT

## (undated) DEVICE — SUT PROLENE 6-0 30" C-1

## (undated) DEVICE — SUT SILK 4-0 30" RB-1

## (undated) DEVICE — SOL BAG NS 0.9% 1000ML

## (undated) DEVICE — SYR LUER LOK 20CC

## (undated) DEVICE — PAD NERVE PHRENIC NERVE

## (undated) DEVICE — DRSG OPSITE 2.5 X 2"

## (undated) DEVICE — SUT PROLENE 5-0 30" RB-2

## (undated) DEVICE — SUT PROLENE 7-0 24" BV175-6

## (undated) DEVICE — DRSG TEGADERM 4 X 4.75"

## (undated) DEVICE — SUT VICRYL 2-0 27" CT-1 UNDYED

## (undated) DEVICE — SUT PROLENE 4-0 36" RB-1

## (undated) DEVICE — SUT DOUBLE 6 WIRE STERNAL

## (undated) DEVICE — DRSG MEPILEX 10 X 25CM (4 X 10") POST OP AG SILVER

## (undated) DEVICE — BULLDOG SPRING CLIP 6MM SOFT/SOFT

## (undated) DEVICE — BLOWER MISTER VIPER II

## (undated) DEVICE — WOUND IRR IRRISEPT W 0.5 CHG

## (undated) DEVICE — DRSG MEPILEX 10 X 10CM (4 X 4") AG

## (undated) DEVICE — SUT SILK 2-0 18" SH (POP-OFF)

## (undated) DEVICE — ELCTR BOVIE PENCIL HANDPIECE ROCKER SWITCH 15FT

## (undated) DEVICE — SUT SOFSILK 4-0 30" TIES

## (undated) DEVICE — POSITIONER FOAM EGG CRATE ULNAR 2PCS (PINK)

## (undated) DEVICE — VESSEL LOOP MAXI-RED  0.120" X 16"

## (undated) DEVICE — SUT VICRYL 3-0 27" CT-1

## (undated) DEVICE — SUT PROLENE 4-0 36" SH

## (undated) DEVICE — SYNOVIS VASCULAR PROBE 1.5MM 15CM

## (undated) DEVICE — SUT ETHIBOND 2-0 30" SH-1 GREEN / WHITE

## (undated) DEVICE — PACK OPEN HEART VAMP PLUS

## (undated) DEVICE — SUT ETHIBOND 4-0 36" RB-1

## (undated) DEVICE — SUT ETHIBOND 2-0 4-30" RB-1 GREEN

## (undated) DEVICE — VASOVIEW HEMOPRO 2

## (undated) DEVICE — STOPCOCK 3 WAY W SWIVEL MALE LUER LOCK

## (undated) DEVICE — CONNECTOR STRAIGHT 3/8 X 1/2"

## (undated) DEVICE — SUT PDS II 2-0 27" CT-1

## (undated) DEVICE — GOWN XL W TOWEL

## (undated) DEVICE — STAPLER SKIN PROXIMATE

## (undated) DEVICE — BEAVER BLADE MINI SHARP ALL ROUND (BLUE)

## (undated) DEVICE — SUT SILK 2 60" TIES

## (undated) DEVICE — VISITEC 4X4

## (undated) DEVICE — SUT SILK 0 30" TIES

## (undated) DEVICE — SUT ETHIBOND 3-0 36" RB-1

## (undated) DEVICE — TUBING INSUFFLATION LAP FILTER 10FT

## (undated) DEVICE — NDL COUNTER FOAM AND MAGNET 40-70

## (undated) DEVICE — ELCTR BOVIE BLADE 3/4" EXTENDED LENGTH 6"

## (undated) DEVICE — TUBING TRUWAVE PRESSURE MALE/FEMALE 72"

## (undated) DEVICE — SUT VICRYL 1 36" CTX UNDYED

## (undated) DEVICE — DRAPE 3/4 SHEET 52X76"

## (undated) DEVICE — SUT SILK 0 30" KS

## (undated) DEVICE — PREP CHLORAPREP HI-LITE ORANGE 26ML

## (undated) DEVICE — URETERAL CATH RED RUBBER 18FR (RED)

## (undated) DEVICE — GOWN TRIMAX LG

## (undated) DEVICE — PACK VALVE

## (undated) DEVICE — SUT VICRYL 0 36" CTX UNDYED

## (undated) DEVICE — STEALTH CLAMP INSERT FIBRA/FIBRA 60MM

## (undated) DEVICE — VENTING ADAPTER "Y" (RED/BLUE) 7.5"

## (undated) DEVICE — SUT PROLENE 5-0 36" RB-1

## (undated) DEVICE — MAXI-FLO SUCTION CATHETER KIT 14FR STRAIGHT

## (undated) DEVICE — SUT BLUNT SZ 5

## (undated) DEVICE — DRSG MEPILEX 10 X 30CM (4 X 12") WHITE

## (undated) DEVICE — SPONGE PEANUT AUTO COUNT